# Patient Record
Sex: FEMALE | Race: OTHER | HISPANIC OR LATINO | Employment: PART TIME | URBAN - METROPOLITAN AREA
[De-identification: names, ages, dates, MRNs, and addresses within clinical notes are randomized per-mention and may not be internally consistent; named-entity substitution may affect disease eponyms.]

---

## 2017-01-04 ENCOUNTER — APPOINTMENT (OUTPATIENT)
Dept: PHYSICAL THERAPY | Age: 42
End: 2017-01-04
Payer: COMMERCIAL

## 2017-01-04 PROCEDURE — 97110 THERAPEUTIC EXERCISES: CPT

## 2017-01-04 PROCEDURE — 97035 APP MDLTY 1+ULTRASOUND EA 15: CPT

## 2017-01-06 ENCOUNTER — APPOINTMENT (OUTPATIENT)
Dept: PHYSICAL THERAPY | Age: 42
End: 2017-01-06
Payer: COMMERCIAL

## 2017-01-11 ENCOUNTER — APPOINTMENT (OUTPATIENT)
Dept: PHYSICAL THERAPY | Age: 42
End: 2017-01-11
Payer: COMMERCIAL

## 2017-01-13 ENCOUNTER — APPOINTMENT (OUTPATIENT)
Dept: PHYSICAL THERAPY | Age: 42
End: 2017-01-13
Payer: COMMERCIAL

## 2017-01-16 ENCOUNTER — GENERIC CONVERSION - ENCOUNTER (OUTPATIENT)
Dept: OTHER | Facility: OTHER | Age: 42
End: 2017-01-16

## 2017-01-17 ENCOUNTER — APPOINTMENT (OUTPATIENT)
Dept: LAB | Facility: HOSPITAL | Age: 42
End: 2017-01-17
Payer: COMMERCIAL

## 2017-01-17 ENCOUNTER — TRANSCRIBE ORDERS (OUTPATIENT)
Dept: LAB | Facility: HOSPITAL | Age: 42
End: 2017-01-17

## 2017-01-17 DIAGNOSIS — F19.20 COMBINATIONS OF DRUG DEPENDENCE EXCLUDING OPIOID TYPE DRUG, CONTINUOUS (HCC): ICD-10-CM

## 2017-01-17 DIAGNOSIS — F41.1 GENERALIZED ANXIETY DISORDER: ICD-10-CM

## 2017-01-17 DIAGNOSIS — F31.81 BIPOLAR II DISORDER (HCC): Primary | ICD-10-CM

## 2017-01-17 DIAGNOSIS — F31.81 BIPOLAR II DISORDER (HCC): ICD-10-CM

## 2017-01-17 LAB
25(OH)D3 SERPL-MCNC: 15.7 NG/ML (ref 30–100)
ALBUMIN SERPL BCP-MCNC: 3.7 G/DL (ref 3.5–5)
ALP SERPL-CCNC: 66 U/L (ref 46–116)
ALT SERPL W P-5'-P-CCNC: 21 U/L (ref 12–78)
ANION GAP SERPL CALCULATED.3IONS-SCNC: 7 MMOL/L (ref 4–13)
AST SERPL W P-5'-P-CCNC: 12 U/L (ref 5–45)
BASOPHILS # BLD AUTO: 0.05 THOUSANDS/ΜL (ref 0–0.1)
BASOPHILS NFR BLD AUTO: 1 % (ref 0–1)
BILIRUB SERPL-MCNC: 0.45 MG/DL (ref 0.2–1)
BUN SERPL-MCNC: 11 MG/DL (ref 5–25)
CALCIUM SERPL-MCNC: 8.6 MG/DL (ref 8.3–10.1)
CHLORIDE SERPL-SCNC: 103 MMOL/L (ref 100–108)
CHOLEST SERPL-MCNC: 171 MG/DL (ref 50–200)
CO2 SERPL-SCNC: 27 MMOL/L (ref 21–32)
CREAT SERPL-MCNC: 0.83 MG/DL (ref 0.6–1.3)
EOSINOPHIL # BLD AUTO: 0.13 THOUSAND/ΜL (ref 0–0.61)
EOSINOPHIL NFR BLD AUTO: 2 % (ref 0–6)
ERYTHROCYTE [DISTWIDTH] IN BLOOD BY AUTOMATED COUNT: 15 % (ref 11.6–15.1)
EST. AVERAGE GLUCOSE BLD GHB EST-MCNC: 103 MG/DL
GFR SERPL CREATININE-BSD FRML MDRD: >60 ML/MIN/1.73SQ M
GLUCOSE SERPL-MCNC: 88 MG/DL (ref 65–140)
HBA1C MFR BLD: 5.2 % (ref 4.2–6.3)
HCT VFR BLD AUTO: 40.2 % (ref 34.8–46.1)
HDLC SERPL-MCNC: 59 MG/DL (ref 40–60)
HGB BLD-MCNC: 13 G/DL (ref 11.5–15.4)
LDLC SERPL CALC-MCNC: 96 MG/DL (ref 0–100)
LYMPHOCYTES # BLD AUTO: 1.17 THOUSANDS/ΜL (ref 0.6–4.47)
LYMPHOCYTES NFR BLD AUTO: 20 % (ref 14–44)
MCH RBC QN AUTO: 26.6 PG (ref 26.8–34.3)
MCHC RBC AUTO-ENTMCNC: 32.3 G/DL (ref 31.4–37.4)
MCV RBC AUTO: 82 FL (ref 82–98)
MONOCYTES # BLD AUTO: 0.54 THOUSAND/ΜL (ref 0.17–1.22)
MONOCYTES NFR BLD AUTO: 9 % (ref 4–12)
NEUTROPHILS # BLD AUTO: 3.88 THOUSANDS/ΜL (ref 1.85–7.62)
NEUTS SEG NFR BLD AUTO: 68 % (ref 43–75)
NRBC BLD AUTO-RTO: 0 /100 WBCS
PLATELET # BLD AUTO: 257 THOUSANDS/UL (ref 149–390)
PMV BLD AUTO: 11.7 FL (ref 8.9–12.7)
POTASSIUM SERPL-SCNC: 3.5 MMOL/L (ref 3.5–5.3)
PROT SERPL-MCNC: 8.2 G/DL (ref 6.4–8.2)
RBC # BLD AUTO: 4.89 MILLION/UL (ref 3.81–5.12)
SODIUM SERPL-SCNC: 137 MMOL/L (ref 136–145)
TRIGL SERPL-MCNC: 79 MG/DL
TSH SERPL DL<=0.05 MIU/L-ACNC: 1.76 UIU/ML (ref 0.36–3.74)
WBC # BLD AUTO: 5.79 THOUSAND/UL (ref 4.31–10.16)

## 2017-01-17 PROCEDURE — 85025 COMPLETE CBC W/AUTO DIFF WBC: CPT

## 2017-01-17 PROCEDURE — 84443 ASSAY THYROID STIM HORMONE: CPT

## 2017-01-17 PROCEDURE — 80307 DRUG TEST PRSMV CHEM ANLYZR: CPT | Performed by: PSYCHIATRY & NEUROLOGY

## 2017-01-17 PROCEDURE — 80053 COMPREHEN METABOLIC PANEL: CPT

## 2017-01-17 PROCEDURE — 83036 HEMOGLOBIN GLYCOSYLATED A1C: CPT

## 2017-01-17 PROCEDURE — 36415 COLL VENOUS BLD VENIPUNCTURE: CPT

## 2017-01-17 PROCEDURE — 82306 VITAMIN D 25 HYDROXY: CPT

## 2017-01-17 PROCEDURE — 80061 LIPID PANEL: CPT

## 2017-01-18 LAB
AMPHETAMINES UR QL SCN: NEGATIVE NG/ML
BARBITURATES UR QL SCN: NEGATIVE NG/ML
BENZODIAZ UR QL SCN: NEGATIVE NG/ML
BZE UR QL SCN: NEGATIVE NG/ML
CANNABINOIDS UR QL SCN: NEGATIVE NG/ML
METHADONE UR QL SCN: NEGATIVE NG/ML
OPIATES UR QL: NEGATIVE NG/ML
PCP UR QL: NEGATIVE NG/ML
PROPOXYPH UR QL: NEGATIVE NG/ML

## 2017-01-28 ENCOUNTER — TRANSCRIBE ORDERS (OUTPATIENT)
Dept: LAB | Facility: HOSPITAL | Age: 42
End: 2017-01-28

## 2017-01-28 ENCOUNTER — APPOINTMENT (OUTPATIENT)
Dept: LAB | Facility: HOSPITAL | Age: 42
End: 2017-01-28
Payer: COMMERCIAL

## 2017-01-28 DIAGNOSIS — E78.2 MIXED HYPERLIPIDEMIA: ICD-10-CM

## 2017-01-28 DIAGNOSIS — R73.09 IMPAIRED GLUCOSE TOLERANCE TEST: ICD-10-CM

## 2017-01-28 DIAGNOSIS — E55.9 UNSPECIFIED VITAMIN D DEFICIENCY: ICD-10-CM

## 2017-01-28 DIAGNOSIS — E66.9 OBESITY, UNSPECIFIED: ICD-10-CM

## 2017-01-28 DIAGNOSIS — E66.9 OBESITY, UNSPECIFIED: Primary | ICD-10-CM

## 2017-01-28 LAB
25(OH)D3 SERPL-MCNC: 18.3 NG/ML (ref 30–100)
ALBUMIN SERPL BCP-MCNC: 3.6 G/DL (ref 3.5–5)
ALP SERPL-CCNC: 66 U/L (ref 46–116)
ALT SERPL W P-5'-P-CCNC: 21 U/L (ref 12–78)
ANION GAP SERPL CALCULATED.3IONS-SCNC: 8 MMOL/L (ref 4–13)
AST SERPL W P-5'-P-CCNC: 13 U/L (ref 5–45)
BILIRUB SERPL-MCNC: 0.45 MG/DL (ref 0.2–1)
BUN SERPL-MCNC: 11 MG/DL (ref 5–25)
CALCIUM SERPL-MCNC: 9 MG/DL (ref 8.3–10.1)
CHLORIDE SERPL-SCNC: 108 MMOL/L (ref 100–108)
CHOLEST SERPL-MCNC: 160 MG/DL (ref 50–200)
CO2 SERPL-SCNC: 24 MMOL/L (ref 21–32)
CREAT SERPL-MCNC: 0.84 MG/DL (ref 0.6–1.3)
EST. AVERAGE GLUCOSE BLD GHB EST-MCNC: 91 MG/DL
GFR SERPL CREATININE-BSD FRML MDRD: >60 ML/MIN/1.73SQ M
GLUCOSE SERPL-MCNC: 88 MG/DL (ref 65–140)
HBA1C MFR BLD: 4.8 % (ref 4.2–6.3)
HDLC SERPL-MCNC: 60 MG/DL (ref 40–60)
LDLC SERPL CALC-MCNC: 90 MG/DL (ref 0–100)
POTASSIUM SERPL-SCNC: 3.9 MMOL/L (ref 3.5–5.3)
PROT SERPL-MCNC: 7.9 G/DL (ref 6.4–8.2)
SODIUM SERPL-SCNC: 140 MMOL/L (ref 136–145)
TRIGL SERPL-MCNC: 50 MG/DL
TSH SERPL DL<=0.05 MIU/L-ACNC: 1.8 UIU/ML (ref 0.36–3.74)

## 2017-01-28 PROCEDURE — 36415 COLL VENOUS BLD VENIPUNCTURE: CPT

## 2017-01-28 PROCEDURE — 83036 HEMOGLOBIN GLYCOSYLATED A1C: CPT

## 2017-01-28 PROCEDURE — 80061 LIPID PANEL: CPT

## 2017-01-28 PROCEDURE — 84443 ASSAY THYROID STIM HORMONE: CPT

## 2017-01-28 PROCEDURE — 82306 VITAMIN D 25 HYDROXY: CPT

## 2017-01-28 PROCEDURE — 80053 COMPREHEN METABOLIC PANEL: CPT

## 2017-02-24 ENCOUNTER — ALLSCRIPTS OFFICE VISIT (OUTPATIENT)
Dept: OTHER | Facility: OTHER | Age: 42
End: 2017-02-24

## 2017-03-03 ENCOUNTER — LAB REQUISITION (OUTPATIENT)
Dept: LAB | Facility: HOSPITAL | Age: 42
End: 2017-03-03
Payer: COMMERCIAL

## 2017-03-03 ENCOUNTER — ALLSCRIPTS OFFICE VISIT (OUTPATIENT)
Dept: OTHER | Facility: OTHER | Age: 42
End: 2017-03-03

## 2017-03-03 ENCOUNTER — GENERIC CONVERSION - ENCOUNTER (OUTPATIENT)
Dept: OTHER | Facility: OTHER | Age: 42
End: 2017-03-03

## 2017-03-03 DIAGNOSIS — R31.9 HEMATURIA: ICD-10-CM

## 2017-03-03 LAB
BACTERIA UR QL AUTO: ABNORMAL /HPF
BILIRUB UR QL STRIP: NEGATIVE
BILIRUB UR QL STRIP: NEGATIVE
CLARITY UR: ABNORMAL
CLARITY UR: NORMAL
COLOR UR: YELLOW
COLOR UR: YELLOW
GLUCOSE (HISTORICAL): NEGATIVE
GLUCOSE UR STRIP-MCNC: NEGATIVE MG/DL
HGB UR QL STRIP.AUTO: NEGATIVE
HGB UR QL STRIP.AUTO: NEGATIVE
HYALINE CASTS #/AREA URNS LPF: ABNORMAL /LPF
KETONES UR STRIP-MCNC: NEGATIVE MG/DL
KETONES UR STRIP-MCNC: NEGATIVE MG/DL
LEUKOCYTE ESTERASE UR QL STRIP: ABNORMAL
LEUKOCYTE ESTERASE UR QL STRIP: NORMAL
NITRITE UR QL STRIP: NEGATIVE
NITRITE UR QL STRIP: NEGATIVE
NON-SQ EPI CELLS URNS QL MICRO: ABNORMAL /HPF
PH UR STRIP.AUTO: 6.5 [PH]
PH UR STRIP.AUTO: 7.5 [PH] (ref 4.5–8)
PROT UR STRIP-MCNC: NEGATIVE MG/DL
PROT UR STRIP-MCNC: NEGATIVE MG/DL
RBC #/AREA URNS AUTO: ABNORMAL /HPF
SP GR UR STRIP.AUTO: 1.01
SP GR UR STRIP.AUTO: 1.01 (ref 1–1.03)
UROBILINOGEN UR QL STRIP.AUTO: 0.2
UROBILINOGEN UR QL STRIP.AUTO: 0.2 E.U./DL
WBC #/AREA URNS AUTO: ABNORMAL /HPF

## 2017-03-03 PROCEDURE — 81001 URINALYSIS AUTO W/SCOPE: CPT | Performed by: NURSE PRACTITIONER

## 2017-03-03 PROCEDURE — 87086 URINE CULTURE/COLONY COUNT: CPT | Performed by: NURSE PRACTITIONER

## 2017-03-03 PROCEDURE — 87147 CULTURE TYPE IMMUNOLOGIC: CPT | Performed by: NURSE PRACTITIONER

## 2017-03-04 LAB — BACTERIA UR CULT: NORMAL

## 2017-04-04 ENCOUNTER — APPOINTMENT (EMERGENCY)
Dept: RADIOLOGY | Facility: HOSPITAL | Age: 42
End: 2017-04-04
Payer: COMMERCIAL

## 2017-04-04 ENCOUNTER — HOSPITAL ENCOUNTER (EMERGENCY)
Facility: HOSPITAL | Age: 42
Discharge: HOME | End: 2017-04-04
Attending: EMERGENCY MEDICINE | Admitting: EMERGENCY MEDICINE
Payer: COMMERCIAL

## 2017-04-04 VITALS
HEIGHT: 63 IN | SYSTOLIC BLOOD PRESSURE: 118 MMHG | RESPIRATION RATE: 18 BRPM | OXYGEN SATURATION: 100 % | DIASTOLIC BLOOD PRESSURE: 72 MMHG | WEIGHT: 189 LBS | TEMPERATURE: 97.4 F | HEART RATE: 55 BPM | BODY MASS INDEX: 33.49 KG/M2

## 2017-04-04 DIAGNOSIS — G43.909 MIGRAINE: Primary | ICD-10-CM

## 2017-04-04 PROCEDURE — 93005 ELECTROCARDIOGRAM TRACING: CPT

## 2017-04-04 PROCEDURE — 70450 CT HEAD/BRAIN W/O DYE: CPT

## 2017-04-04 PROCEDURE — 96361 HYDRATE IV INFUSION ADD-ON: CPT

## 2017-04-04 PROCEDURE — 96375 TX/PRO/DX INJ NEW DRUG ADDON: CPT

## 2017-04-04 PROCEDURE — 96374 THER/PROPH/DIAG INJ IV PUSH: CPT

## 2017-04-04 PROCEDURE — 99284 EMERGENCY DEPT VISIT MOD MDM: CPT

## 2017-04-04 RX ORDER — METOCLOPRAMIDE HYDROCHLORIDE 5 MG/ML
10 INJECTION INTRAMUSCULAR; INTRAVENOUS ONCE
Status: COMPLETED | OUTPATIENT
Start: 2017-04-04 | End: 2017-04-04

## 2017-04-04 RX ORDER — DIPHENHYDRAMINE HYDROCHLORIDE 50 MG/ML
25 INJECTION INTRAMUSCULAR; INTRAVENOUS ONCE
Status: COMPLETED | OUTPATIENT
Start: 2017-04-04 | End: 2017-04-04

## 2017-04-04 RX ORDER — KETOROLAC TROMETHAMINE 30 MG/ML
15 INJECTION, SOLUTION INTRAMUSCULAR; INTRAVENOUS ONCE
Status: COMPLETED | OUTPATIENT
Start: 2017-04-04 | End: 2017-04-04

## 2017-04-04 RX ORDER — NAPROXEN 500 MG/1
500 TABLET ORAL 2 TIMES DAILY WITH MEALS
Qty: 30 TABLET | Refills: 0 | Status: SHIPPED | OUTPATIENT
Start: 2017-04-04 | End: 2018-03-11

## 2017-04-04 RX ADMIN — METOCLOPRAMIDE 10 MG: 5 INJECTION, SOLUTION INTRAMUSCULAR; INTRAVENOUS at 21:55

## 2017-04-04 RX ADMIN — SODIUM CHLORIDE 1000 ML: 0.9 INJECTION, SOLUTION INTRAVENOUS at 21:54

## 2017-04-04 RX ADMIN — KETOROLAC TROMETHAMINE 15 MG: 30 INJECTION, SOLUTION INTRAMUSCULAR at 21:55

## 2017-04-04 RX ADMIN — DIPHENHYDRAMINE HYDROCHLORIDE 25 MG: 50 INJECTION, SOLUTION INTRAMUSCULAR; INTRAVENOUS at 21:55

## 2017-04-05 LAB
ATRIAL RATE: 70 BPM
P AXIS: 22 DEGREES
PR INTERVAL: 136 MS
QRS AXIS: 21 DEGREES
QRSD INTERVAL: 100 MS
QT INTERVAL: 416 MS
QTC INTERVAL: 449 MS
T WAVE AXIS: 21 DEGREES
VENTRICULAR RATE: 70 BPM

## 2017-04-10 ENCOUNTER — ALLSCRIPTS OFFICE VISIT (OUTPATIENT)
Dept: OTHER | Facility: OTHER | Age: 42
End: 2017-04-10

## 2017-07-13 ENCOUNTER — GENERIC CONVERSION - ENCOUNTER (OUTPATIENT)
Dept: OTHER | Facility: OTHER | Age: 42
End: 2017-07-13

## 2017-08-09 ENCOUNTER — APPOINTMENT (OUTPATIENT)
Dept: URGENT CARE | Age: 42
End: 2017-08-09
Payer: OTHER MISCELLANEOUS

## 2017-08-09 PROCEDURE — G0383 LEV 4 HOSP TYPE B ED VISIT: HCPCS | Performed by: PREVENTIVE MEDICINE

## 2017-08-09 PROCEDURE — 99284 EMERGENCY DEPT VISIT MOD MDM: CPT | Performed by: PREVENTIVE MEDICINE

## 2017-08-12 ENCOUNTER — APPOINTMENT (OUTPATIENT)
Dept: URGENT CARE | Age: 42
End: 2017-08-12
Payer: OTHER MISCELLANEOUS

## 2017-08-12 ENCOUNTER — APPOINTMENT (OUTPATIENT)
Dept: RADIOLOGY | Age: 42
End: 2017-08-12
Attending: PHYSICIAN ASSISTANT
Payer: OTHER MISCELLANEOUS

## 2017-08-12 ENCOUNTER — TRANSCRIBE ORDERS (OUTPATIENT)
Dept: URGENT CARE | Age: 42
End: 2017-08-12

## 2017-08-12 DIAGNOSIS — T14.90XA INJURY: Primary | ICD-10-CM

## 2017-08-12 PROCEDURE — 99213 OFFICE O/P EST LOW 20 MIN: CPT | Performed by: PREVENTIVE MEDICINE

## 2017-08-12 PROCEDURE — 72100 X-RAY EXAM L-S SPINE 2/3 VWS: CPT

## 2017-08-18 ENCOUNTER — APPOINTMENT (OUTPATIENT)
Dept: URGENT CARE | Age: 42
End: 2017-08-18
Payer: OTHER MISCELLANEOUS

## 2017-08-18 PROCEDURE — 99213 OFFICE O/P EST LOW 20 MIN: CPT | Performed by: PREVENTIVE MEDICINE

## 2017-08-25 ENCOUNTER — APPOINTMENT (OUTPATIENT)
Dept: URGENT CARE | Age: 42
End: 2017-08-25
Payer: OTHER MISCELLANEOUS

## 2017-08-25 PROCEDURE — 99213 OFFICE O/P EST LOW 20 MIN: CPT | Performed by: PREVENTIVE MEDICINE

## 2017-08-28 ENCOUNTER — APPOINTMENT (OUTPATIENT)
Dept: PHYSICAL THERAPY | Facility: CLINIC | Age: 42
End: 2017-08-28
Payer: OTHER MISCELLANEOUS

## 2017-08-28 PROCEDURE — 97161 PT EVAL LOW COMPLEX 20 MIN: CPT

## 2017-08-31 ENCOUNTER — APPOINTMENT (OUTPATIENT)
Dept: PHYSICAL THERAPY | Facility: CLINIC | Age: 42
End: 2017-08-31
Payer: OTHER MISCELLANEOUS

## 2017-08-31 PROCEDURE — 97110 THERAPEUTIC EXERCISES: CPT

## 2017-08-31 PROCEDURE — 97140 MANUAL THERAPY 1/> REGIONS: CPT

## 2017-09-05 ENCOUNTER — APPOINTMENT (OUTPATIENT)
Dept: PHYSICAL THERAPY | Facility: CLINIC | Age: 42
End: 2017-09-05
Payer: OTHER MISCELLANEOUS

## 2017-09-05 PROCEDURE — 97110 THERAPEUTIC EXERCISES: CPT

## 2017-09-05 PROCEDURE — 97140 MANUAL THERAPY 1/> REGIONS: CPT

## 2017-09-07 ENCOUNTER — APPOINTMENT (OUTPATIENT)
Dept: PHYSICAL THERAPY | Facility: CLINIC | Age: 42
End: 2017-09-07
Payer: OTHER MISCELLANEOUS

## 2017-09-08 ENCOUNTER — APPOINTMENT (OUTPATIENT)
Dept: URGENT CARE | Age: 42
End: 2017-09-08
Payer: OTHER MISCELLANEOUS

## 2017-09-08 PROCEDURE — 99213 OFFICE O/P EST LOW 20 MIN: CPT | Performed by: PREVENTIVE MEDICINE

## 2017-09-11 ENCOUNTER — APPOINTMENT (OUTPATIENT)
Dept: PHYSICAL THERAPY | Facility: CLINIC | Age: 42
End: 2017-09-11
Payer: OTHER MISCELLANEOUS

## 2017-09-11 PROCEDURE — 97140 MANUAL THERAPY 1/> REGIONS: CPT

## 2017-09-11 PROCEDURE — 97110 THERAPEUTIC EXERCISES: CPT

## 2017-09-14 ENCOUNTER — APPOINTMENT (OUTPATIENT)
Dept: PHYSICAL THERAPY | Facility: CLINIC | Age: 42
End: 2017-09-14
Payer: OTHER MISCELLANEOUS

## 2017-09-14 PROCEDURE — 97010 HOT OR COLD PACKS THERAPY: CPT

## 2017-09-14 PROCEDURE — 97110 THERAPEUTIC EXERCISES: CPT

## 2017-09-14 PROCEDURE — 97140 MANUAL THERAPY 1/> REGIONS: CPT

## 2017-09-15 ENCOUNTER — APPOINTMENT (OUTPATIENT)
Dept: URGENT CARE | Age: 42
End: 2017-09-15
Payer: OTHER MISCELLANEOUS

## 2017-09-15 PROCEDURE — 99214 OFFICE O/P EST MOD 30 MIN: CPT | Performed by: PREVENTIVE MEDICINE

## 2017-09-18 ENCOUNTER — APPOINTMENT (OUTPATIENT)
Dept: PHYSICAL THERAPY | Facility: CLINIC | Age: 42
End: 2017-09-18
Payer: OTHER MISCELLANEOUS

## 2017-09-18 PROCEDURE — 97110 THERAPEUTIC EXERCISES: CPT

## 2017-09-18 PROCEDURE — 97140 MANUAL THERAPY 1/> REGIONS: CPT

## 2017-09-21 ENCOUNTER — APPOINTMENT (OUTPATIENT)
Dept: PHYSICAL THERAPY | Facility: CLINIC | Age: 42
End: 2017-09-21
Payer: OTHER MISCELLANEOUS

## 2017-09-21 PROCEDURE — 97010 HOT OR COLD PACKS THERAPY: CPT

## 2017-09-21 PROCEDURE — 97140 MANUAL THERAPY 1/> REGIONS: CPT

## 2017-09-21 PROCEDURE — 97110 THERAPEUTIC EXERCISES: CPT

## 2017-09-26 ENCOUNTER — APPOINTMENT (OUTPATIENT)
Dept: URGENT CARE | Age: 42
End: 2017-09-26
Payer: OTHER MISCELLANEOUS

## 2017-09-26 ENCOUNTER — APPOINTMENT (OUTPATIENT)
Dept: PHYSICAL THERAPY | Facility: CLINIC | Age: 42
End: 2017-09-26
Payer: OTHER MISCELLANEOUS

## 2017-09-26 PROCEDURE — 99205 OFFICE O/P NEW HI 60 MIN: CPT | Performed by: PREVENTIVE MEDICINE

## 2017-09-26 PROCEDURE — 97110 THERAPEUTIC EXERCISES: CPT

## 2017-09-26 PROCEDURE — 97140 MANUAL THERAPY 1/> REGIONS: CPT

## 2017-09-28 ENCOUNTER — APPOINTMENT (OUTPATIENT)
Dept: PHYSICAL THERAPY | Facility: CLINIC | Age: 42
End: 2017-09-28
Payer: OTHER MISCELLANEOUS

## 2017-09-28 PROCEDURE — 97110 THERAPEUTIC EXERCISES: CPT

## 2017-09-28 PROCEDURE — 97140 MANUAL THERAPY 1/> REGIONS: CPT

## 2017-10-02 ENCOUNTER — APPOINTMENT (OUTPATIENT)
Dept: PHYSICAL THERAPY | Facility: CLINIC | Age: 42
End: 2017-10-02
Payer: OTHER MISCELLANEOUS

## 2017-10-02 PROCEDURE — 97140 MANUAL THERAPY 1/> REGIONS: CPT

## 2017-10-02 PROCEDURE — 97110 THERAPEUTIC EXERCISES: CPT

## 2017-10-04 ENCOUNTER — APPOINTMENT (OUTPATIENT)
Dept: URGENT CARE | Age: 42
End: 2017-10-04
Payer: OTHER MISCELLANEOUS

## 2017-10-04 PROCEDURE — 99213 OFFICE O/P EST LOW 20 MIN: CPT | Performed by: PREVENTIVE MEDICINE

## 2017-10-05 ENCOUNTER — APPOINTMENT (OUTPATIENT)
Dept: PHYSICAL THERAPY | Facility: CLINIC | Age: 42
End: 2017-10-05
Payer: OTHER MISCELLANEOUS

## 2017-10-05 PROCEDURE — 97112 NEUROMUSCULAR REEDUCATION: CPT

## 2017-10-05 PROCEDURE — 97110 THERAPEUTIC EXERCISES: CPT

## 2017-10-05 PROCEDURE — 97140 MANUAL THERAPY 1/> REGIONS: CPT

## 2017-10-09 ENCOUNTER — APPOINTMENT (OUTPATIENT)
Dept: PHYSICAL THERAPY | Facility: CLINIC | Age: 42
End: 2017-10-09
Payer: OTHER MISCELLANEOUS

## 2017-10-09 PROCEDURE — 97140 MANUAL THERAPY 1/> REGIONS: CPT

## 2017-10-09 PROCEDURE — 97110 THERAPEUTIC EXERCISES: CPT

## 2017-10-16 ENCOUNTER — APPOINTMENT (OUTPATIENT)
Dept: URGENT CARE | Age: 42
End: 2017-10-16
Payer: OTHER MISCELLANEOUS

## 2017-10-16 PROCEDURE — 99213 OFFICE O/P EST LOW 20 MIN: CPT | Performed by: PREVENTIVE MEDICINE

## 2017-10-17 ENCOUNTER — APPOINTMENT (OUTPATIENT)
Dept: PHYSICAL THERAPY | Facility: CLINIC | Age: 42
End: 2017-10-17
Payer: OTHER MISCELLANEOUS

## 2017-10-17 PROCEDURE — G8991 OTHER PT/OT GOAL STATUS: HCPCS

## 2017-10-17 PROCEDURE — 97110 THERAPEUTIC EXERCISES: CPT

## 2017-10-17 PROCEDURE — G8990 OTHER PT/OT CURRENT STATUS: HCPCS

## 2017-10-19 ENCOUNTER — APPOINTMENT (OUTPATIENT)
Dept: PHYSICAL THERAPY | Facility: CLINIC | Age: 42
End: 2017-10-19
Payer: OTHER MISCELLANEOUS

## 2017-10-19 PROCEDURE — 97140 MANUAL THERAPY 1/> REGIONS: CPT

## 2017-10-19 PROCEDURE — 97112 NEUROMUSCULAR REEDUCATION: CPT

## 2017-10-19 PROCEDURE — 97110 THERAPEUTIC EXERCISES: CPT

## 2017-10-23 ENCOUNTER — APPOINTMENT (OUTPATIENT)
Dept: PHYSICAL THERAPY | Facility: CLINIC | Age: 42
End: 2017-10-23
Payer: OTHER MISCELLANEOUS

## 2017-10-26 ENCOUNTER — APPOINTMENT (OUTPATIENT)
Dept: PHYSICAL THERAPY | Facility: CLINIC | Age: 42
End: 2017-10-26
Payer: OTHER MISCELLANEOUS

## 2017-10-26 PROCEDURE — 97110 THERAPEUTIC EXERCISES: CPT

## 2017-10-26 PROCEDURE — 97112 NEUROMUSCULAR REEDUCATION: CPT

## 2017-10-26 PROCEDURE — 97150 GROUP THERAPEUTIC PROCEDURES: CPT

## 2017-10-30 ENCOUNTER — APPOINTMENT (OUTPATIENT)
Dept: PHYSICAL THERAPY | Facility: CLINIC | Age: 42
End: 2017-10-30
Payer: OTHER MISCELLANEOUS

## 2017-10-31 ENCOUNTER — APPOINTMENT (OUTPATIENT)
Dept: URGENT CARE | Age: 42
End: 2017-10-31
Payer: OTHER MISCELLANEOUS

## 2017-10-31 PROCEDURE — 99213 OFFICE O/P EST LOW 20 MIN: CPT | Performed by: PREVENTIVE MEDICINE

## 2017-12-29 ENCOUNTER — HOSPITAL ENCOUNTER (EMERGENCY)
Facility: HOSPITAL | Age: 42
Discharge: HOME | End: 2017-12-29
Attending: EMERGENCY MEDICINE | Admitting: EMERGENCY MEDICINE
Payer: COMMERCIAL

## 2017-12-29 VITALS
HEIGHT: 63 IN | RESPIRATION RATE: 18 BRPM | TEMPERATURE: 97.8 F | HEART RATE: 91 BPM | BODY MASS INDEX: 30.83 KG/M2 | DIASTOLIC BLOOD PRESSURE: 63 MMHG | OXYGEN SATURATION: 99 % | SYSTOLIC BLOOD PRESSURE: 137 MMHG | WEIGHT: 174 LBS

## 2017-12-29 DIAGNOSIS — R51.9 HEADACHE: Primary | ICD-10-CM

## 2017-12-29 PROCEDURE — 99283 EMERGENCY DEPT VISIT LOW MDM: CPT

## 2017-12-29 PROCEDURE — 96361 HYDRATE IV INFUSION ADD-ON: CPT

## 2017-12-29 PROCEDURE — 96374 THER/PROPH/DIAG INJ IV PUSH: CPT

## 2017-12-29 PROCEDURE — 96375 TX/PRO/DX INJ NEW DRUG ADDON: CPT

## 2017-12-29 RX ORDER — KETOROLAC TROMETHAMINE 30 MG/ML
15 INJECTION, SOLUTION INTRAMUSCULAR; INTRAVENOUS ONCE
Status: COMPLETED | OUTPATIENT
Start: 2017-12-29 | End: 2017-12-29

## 2017-12-29 RX ORDER — METOCLOPRAMIDE HYDROCHLORIDE 5 MG/ML
10 INJECTION INTRAMUSCULAR; INTRAVENOUS ONCE
Status: COMPLETED | OUTPATIENT
Start: 2017-12-29 | End: 2017-12-29

## 2017-12-29 RX ADMIN — KETOROLAC TROMETHAMINE 15 MG: 30 INJECTION, SOLUTION INTRAMUSCULAR at 16:31

## 2017-12-29 RX ADMIN — SODIUM CHLORIDE 1000 ML: 0.9 INJECTION, SOLUTION INTRAVENOUS at 16:31

## 2017-12-29 RX ADMIN — METOCLOPRAMIDE 10 MG: 5 INJECTION, SOLUTION INTRAMUSCULAR; INTRAVENOUS at 16:31

## 2017-12-29 NOTE — DISCHARGE INSTRUCTIONS
Dolor de dante severo, cuidados ambulatorios   INFORMACIÓN GENERAL:   Un dolor de dante severo  es un dolor o incomodidad que empieza de repente y empeora rápidamente  Se desconoce la causa de un dolor de dante severo o donald  Es posible que sea provocado por estrés, fatiga, hormonas, alimentos o traumas  Los siguientes son los síntomas más comunes de un donald dolor de dante:   · Fiebre    · Presión en los senos paranasales (sinusitis)    · Pérdida de la memoria    · Náuseas o vómito    · Problemas con la visión, wander ojos rojos, lagrimeo, pérdida de la visión o dolor con la katie brillante    · Rigidez del contreras    · Sensibilidad del área de la dante y el contreras    · Mayor dificultad de la acostumbrada para permanecer despierto o para estar alerta que     · Debilidad o falta de energía  Busque atención inmediata al presentar los siguientes síntomas:   · Dolor donald    · Un dolor de dante que ocurre después de un golpe a la dante, rod caída o un trauma     · Confusión o estar olvidadizo    · Entumecimiento a un costado de la ludy o del cuerpo  El tratamiento para un dolor de dante donald  puede incluir medicamento para tratar el dolor  También puede necesitar terapia de retro-alimentiación (biofeedback) o terapia de comportamiento cognitivo  Consulte con cee proveedor de [employer] y otros tratamientos para un dolor de dante donald  La forma de lidiar con los síntomas:   · Aplique calor  en cee dante daniel 20 a 30 minutos cada 2 horas por los [employer] indicaron  El calor ayuda a disminuir el dolor y los espasmos musculares  Se puede alternar entre calor y frío  · Aplique hielo  en cee dante por 15 a 20 minutos cada hora según las indicaciones  Use rod compresa de hielo o coloque hielo triturado en rod bolsa plástica y cúbrala con rod toalla  El hielo disminuye el dolor  · Relaje migdalia músculos  Acuéstese en rod posición cómoda y cierre los ojos  Relaje migdalia músculos despacio   Empiece con los dedos de migdalia pies y [employer] subiendo por arora cuerpo  · Mantenga un registro de los ryan de [place]  Griselda Clos y terminó arora dolor de [place]  Incluya los síntomas y lo que estaba haciendo cuando el dolor de [place]  Escriba en el registro lo que usted comió o bebió daniel las 24 horas antes de que empezara arora dolor de [place]  Flor C[place] dolor y [employer]  Registre lo que hizo para tratar arora dolor de dante y si funciono o no  Acuda a arora j carlos de control con arora proveedor de mitzy según le indicaron:  Lleve arora registro de los ryan de dante cuando acuda a la j carlos con arora proveedor de mitzy  Escriba las preguntas que tenga para que no se le olvide hacerlas daniel las citas médicas  ACUERDOS SOBRE ARORA CUIDADO:   Usted tiene el derecho de participar en la planificación de arora cuidado  Aprenda todo lo que pueda sobre arora condición y wander darle tratamiento  Discuta con migdalia médicos migdalia opciones de tratamiento para juntos decidir el cuidado que usted quiere recibir  Usted siempre tiene el derecho a rechazar arora tratamiento  Esta información es sólo para uso en educación  Arora intención no es darle un consejo médico sobre enfermedades o tratamientos  Colsulte con arora Michelle Angst farmacéutico antes de seguir cualquier régimen médico para saber si es seguro y efectivo para usted  © 2014 [address] is for End User's use only and may not be sold, redistributed or otherwise used for commercial purposes  All illustrations and images included in CareNotes® are the copyrighted property of A D A M , Inc  or Jose Maria Toscano

## 2017-12-29 NOTE — ED PROVIDER NOTES
History  Chief Complaint   Patient presents with    Headache     pt reports headache and light sensitivity  A 72-year-old female with no significant past medical history; presents with a headache since 10:00 p m  last evening  Headache was of gradually onset and has gotten progressively worse since onset  Headache is associated with dizziness, photophobia and phonophobia  Patient also reports multiple episodes of nausea and vomiting  Patient has not taken anything for her symptoms  Patient otherwise denies fever, chills, blurred vision, neck pain/stiffness, chest pain, shortness of breath, abdominal pain, diarrhea, paresthesias and weakness  Patient states she had a similar headache 6 months ago where she was seen in the ED and treated, patient was provided with follow-up however she did not make an appointment  Patient has had intermittent headaches over the past 6 months  A/P:  Headache, likely consistent with a migraine  Patient is neurologically intact  Neck is supple without meningismus signs  Will treat with a migraine cocktail and reassess  History provided by:  Patient      Prior to Admission Medications   Prescriptions Last Dose Informant Patient Reported? Taking?   naproxen (NAPROSYN) 500 mg tablet   No No   Sig: Take 1 tablet by mouth 2 (two) times a day with meals for 30 days      Facility-Administered Medications: None       History reviewed  No pertinent past medical history  Past Surgical History:   Procedure Laterality Date     SECTION      CHOLECYSTECTOMY      KIDNEY STONE SURGERY         History reviewed  No pertinent family history  I have reviewed and agree with the history as documented  Social History   Substance Use Topics    Smoking status: Never Smoker    Smokeless tobacco: Never Used    Alcohol use No        Review of Systems   Eyes: Positive for photophobia  Gastrointestinal: Positive for nausea and vomiting     Neurological: Positive for dizziness and headaches  All other systems reviewed and are negative  Physical Exam  ED Triage Vitals   Temperature Pulse Respirations Blood Pressure SpO2   12/29/17 1431 12/29/17 1431 12/29/17 1431 12/29/17 1431 12/29/17 1431   97 8 °F (36 6 °C) 91 18 137/63 99 %      Temp Source Heart Rate Source Patient Position - Orthostatic VS BP Location FiO2 (%)   12/29/17 1431 12/29/17 1431 12/29/17 1431 12/29/17 1431 --   Oral Monitor Sitting Left arm       Pain Score       12/29/17 1539       6           Orthostatic Vital Signs  Vitals:    12/29/17 1431   BP: 137/63   Pulse: 91   Patient Position - Orthostatic VS: Sitting       Physical Exam   General Appearance: alert and oriented, nad, non toxic appearing  Skin:  Warm, dry, intact  HEENT: atraumatic, normocephalic; PERRLA, EOMI  Neck: Supple, trachea midline; right trapezius muscle tenderness and spasm  Cardiac: RRR; no murmurs, rub, gallops  Pulmonary: lungs CTAB; no wheezes, rales, rhonchi  Gastrointestinal: abdomen soft, nontender, nondistended; no guarding or rebound tenderness; good bowel sounds, no mass or bruits  Extremities:  no pedal edema, 2+ pulses; no calf tenderness, no clubbing, no cyanosis  Neuro:  no focal motor or sensory deficits, CN 2-12 grossly intact  Psych:  Normal mood and affect, normal judgement and insight      ED Medications  Medications   sodium chloride 0 9 % bolus 1,000 mL (0 mL Intravenous Stopped 12/29/17 1742)   ketorolac (TORADOL) injection 15 mg (15 mg Intravenous Given 12/29/17 1631)   metoclopramide (REGLAN) injection 10 mg (10 mg Intravenous Given 12/29/17 1631)       Diagnostic Studies  Results Reviewed     None                 No orders to display         Procedures  Procedures      Phone Consults  ED Phone Contact    ED Course  ED Course as of Dec 30 1134   Fri Dec 29, 2017   1736 Pt feeling much better at this time  Will discharge home with PCP follow up                                  MATTHEW FarristCren Time    Disposition  Final diagnoses:   Headache     Time reflects when diagnosis was documented in both MDM as applicable and the Disposition within this note     Time User Action Codes Description Comment    12/29/2017  5:37 PM Titi Glaser Add [R51] Headache       ED Disposition     ED Disposition Condition Comment    Discharge  Aj Thomas 6298 discharge to home/self care  Condition at discharge: Good        Follow-up Information     Follow up With Specialties Details Why Maria Isabel Castro, Internal Medicine Schedule an appointment as soon as possible for a visit in 3 days For re-evaluation [facility]  [address]  [phone]          Discharge Medication List as of 12/29/2017  5:37 PM      CONTINUE these medications which have NOT CHANGED    Details   naproxen (NAPROSYN) 500 mg tablet Take 1 tablet by mouth 2 (two) times a day with meals for 30 days, Starting 4/4/2017, Until Thu 5/4/17, Print           No discharge procedures on file  ED Provider  Attending physically available and evaluated Aj EllingtonMei-ARLENE 4853  I managed the patient along with the ED Attending      Electronically Signed by         Bal Kendall DO  Resident  12/30/17 5600

## 2017-12-29 NOTE — ED NOTES
Pt approached triage room states her headache is getting worse      Alta Boyer, MEGGAN  12/29/17 1174

## 2017-12-29 NOTE — ED ATTENDING ATTESTATION
Jossue Olson DO, saw and evaluated the patient  I have discussed the patient with the resident/non-physician practitioner and agree with the resident's/non-physician practitioner's findings, Plan of Care, and MDM as documented in the resident's/non-physician practitioner's note, except where noted  All available labs and Radiology studies were reviewed  At this point I agree with the current assessment done in the Emergency Department  I have conducted an independent evaluation of this patient a history and physical is as follows:    40 yof with PHILLIPS started last night, gradual onset, not worst HA of life  Similar HA 6 months ago  /63   Pulse 91   Temp 97 8 °F (36 6 °C) (Oral)   Resp 18   Ht 5' 3" (1 6 m)   Wt 78 9 kg (174 lb)   SpO2 99%   BMI 30 82 kg/m²   NAD  Afebrile  No neuro deficits  CTA  RRR  TTP on right trap  IV in place  Metoclopramide, toradol, normal saline  Improved after treatment and wanted to go home       Dx  Headache    Critical Care Time  CritCare Time    Procedures

## 2018-01-09 NOTE — PROGRESS NOTES
Assessment    1  Hematuria (599 70) (R31 9)   2  Adenitis, acute (683) (L04 9)    Plan  Hematuria    · * CT ABDOMEN PELVIS WO CONTRAST; Status:Need Information - Financial  Authorization; Requested for:[id number];    · Urine Dip Non-Automated- POC; Status:Active - Perform Order; Requested  for:[id number];    · *VB - Follow-up With Nurse or MA For BP Check Evaluation and Treatment  Follow-up:  Please perform a dipstick on this patient and if positive for anything measure, let me  know before she leaves  Status: Canceled - Scheduling  Knee pain, right    · Ibuprofen 600 MG Oral Tablet; TOME 1 TABLET POR VIA ORAL TODOS 8  HOURS AS NEEDED FOR PAINTAKE 1 TABLET BY MOUTH EVERY 8 HOURS AS  NEEDED FOR PAIN    Discussion/Summary    You are having adenitis  please start ibuprofen for pain  RTC on Monday for Nurse visit Rayne Álvaro  Will treat based on what we find then  Please be sure to take a complete treatment of the vaginal yeast infection  The patient has the current Goals: Resolution of microscopic hematuria  The patent has the current Barriers: None  The patient was counseled regarding instructions for management, risk factor reductions, risks and benefits of treatment options, importance of compliance with treatment  Possible side effects of new medications were reviewed with the patient/guardian today  The treatment plan was reviewed with the patient/guardian  The patient/guardian understands and agrees with the treatment plan   Patient is able to Self-Care  Educational resources provided:      Chief Complaint  Patient states that she recently went to her GYN doctor, who diagnosed her with a yeast infection and also tested her urine  Her urine resulted in being positive for blood, so she was told to follow up with her PCP  Patient also states that she has been having a sore throat recently  PAULA Guajardo      Presents to the clinic for possible blood in Urine  Also sore throat x4 days History of Present Illness  HPI: as above  She went to he OB/GYN and was told there was blood in her urine upon urinalysis (dipstick)  She reports she did not see the urine with her eyes  She used to have pain with urination before going to the OB/GYN  Upon examination by the OB, was told she as a yeast infection and started on Monistat 7  Soon after that she had her menstrual period which just finished yesterday  At the time the urinalysis was done at the [facility] office, her menstrual period was about to start  Sore throat x4 days, says her daughter has been sick  Rates discomfort at 4/10 severity, says it is more of an itching feeling than pain  a little bit of sneezing, sometimes feeling hot, other times cold, at night  Did not check her temp  Denies coughing, or itchy eyes   Hospital Based Practices Required Assessment:   Pain Assessment   the patient states they have pain  The pain is located in the throat  (on a scale of 0 to 10, the patient rates the pain at 4 )    Prefered Language is  [facility] [place]  Primary Language is  Yakut  Teaching Method: verbal and written   Person Taught: patient   Evaluation Of Learning: verbalized/demonstrated understanding      Review of Systems    Constitutional: No fever, no chills, feels well, no tiredness, no recent weight gain or loss  ENT: sore throat, but no earache, no nosebleeds, no hearing loss, no nasal discharge and no hoarseness  Cardiovascular: no complaints of slow or fast heart rate, no chest pain, no palpitations, no leg claudication or lower extremity edema  Respiratory: no complaints of shortness of breath, no wheezing, no dyspnea on exertion, no orthopnea or PND  Musculoskeletal: no complaints of arthralgia, no myalgia, no joint swelling or stiffness, no limb pain or swelling  Integumentary: no complaints of skin rash or lesion, no itching or dry skin, no skin wounds  ROS reviewed  Active Problems    1  Abnormal weight gain (783 1) (R63 5)   2  Bipolar I disorder (296 7) (F31 9)   3  Fatigue (780 79) (R53 83)   4  Knee pain, right (719 46) (M29 561)   5  Need for diphtheria-tetanus-pertussis (Tdap) vaccine, adult/adolescent (V06 1) (Z23)   6  Need for prophylactic vaccination and inoculation against influenza (V04 81) (Z23)   7  Obesity (278 00) (E66 9)    Past Medical History  Active Problems And Past Medical History Reviewed: The active problems and past medical history were reviewed and updated today  Surgical History  Surgical History Reviewed: The surgical history was reviewed and updated today  Social History    · Daily caffeine consumption   · Family stress (V61 9) (Z63 9)   · Former smoker (V15 82) (Z30 027)   · Four children   · Inadequate exercise (V69 0) (Z72 3)   · Part-time employment   ·  (V61 03) (Z63 5)  The social history was reviewed and updated today  The social history was reviewed and is unchanged  Family History  Family History Reviewed: The family history was reviewed and updated today  Current Meds   1  Adipex-P 37 5 MG Oral Capsule; Therapy: (Recorded:75Bmi0400) to Recorded   2  Cepacol LOZG; Therapy: (Recorded:89Wum4730) to Recorded   3  Daily Multivitamin TABS; Therapy: (Recorded:12Dlr6484) to Recorded   4  Fluticasone Propionate 50 MCG/ACT Nasal Suspension; USE 1 TO 2 SPRAYS IN EACH   NOSTRIL ONCE DAILY; Therapy: 63VJP7230 to (03 17 74 30 53)  Requested for: 32EET0731; Last   Rx:[id number] Ordered   5  HydrOXYzine HCl - 25 MG Oral Tablet; Therapy: (Recorded:60Iga4054) to Recorded   6  Ibuprofen 600 MG Oral Tablet; TOME 1 TABLET POR VIA ORAL TODOS 8 HOURS AS   NEEDED FOR PAINTAKE 1 TABLET BY MOUTH EVERY 8 HOURS AS NEEDED FOR   PAIN;   Therapy: 86KMK0021 to (Evaluate:56Nna9942)  Requested for: 67OYS1369; Last   Rx:65Hyg0709 Ordered   7  Topiramate 25 MG Oral Tablet; Therapy: (Recorded:36Ous3469) to Recorded    The medication list was reviewed and updated today        Allergies 1  No Known Drug Allergies    2  No Known Environmental Allergies   3  No Known Food Allergies    Vitals   Recorded: 62Hjt0813 08:09AM   Temperature 98 8 F   Heart Rate 284   Systolic 402   Diastolic 82   Height 5 ft 2 5 in   Weight 191 lb 12 80 oz   BMI Calculated 34 52   BSA Calculated 1 89     Physical Exam    Constitutional   General appearance: Abnormal   Obese  female in mild discomfort  Eyes   Conjunctiva and lids: No swelling, erythema or discharge  Pupils and irises: Equal, round and reactive to light  Ears, Nose, Mouth, and Throat   External inspection of ears and nose: Normal     Otoscopic examination: Tympanic membranes translucent with normal light reflex  Canals patent without erythema  Nasal mucosa, septum, and turbinates: Normal without edema or erythema  Oropharynx: Abnormal   mild dental caries  No post nasal drip  Pulmonary   Respiratory effort: No increased work of breathing or signs of respiratory distress  Auscultation of lungs: Clear to auscultation  Cardiovascular   Auscultation of heart: Normal rate and rhythm, normal S1 and S2, without murmurs  Psychiatric   Orientation to person, place, and time: Normal     Mood and affect: Normal     Additional Exam:  Examination of the throat is negative  Examination of the neck reveals swollen lymph node on the right upper neck, at the border between the cheek and the neck  Right side only  TTP  No skin discoloration  No visible swelling of the neck  Left side of the neck is negative          Future Appointments    Date/Time Provider Specialty Site   02/27/2017 09:00 AM YEIMI Martell Nurse Schedule  [address] PCP

## 2018-01-11 NOTE — PROGRESS NOTES
Assessment    1  Right flank discomfort (789 09) (R10 9)   2  Bipolar I disorder (296 7) (F31 9)   3  History of Tubal Ligation   4  History of Removal Of Urinary Calculus   5  History of Cholecystectomy Laparoscopic   6  Family history of depression (V17 0) (Z81 8) : Mother   7  Family history of diabetes mellitus (V18 0) (Z83 3) : Father   8   (V61 03) (Z63 5)   9  Four children   10  Family stress (V61 9) (Z63 9)   11  Inadequate exercise (V69 0) (Z72 3)   12  Part-time employment   15  Daily caffeine consumption   14  Encounter for preventive health examination (V70 0) (Z00 00)   15  Need for diphtheria-tetanus-pertussis (Tdap) vaccine, adult/adolescent (V06 1) (Z23)   16  Obesity (278 00) (E66 9)    Plan  Bipolar I disorder, Health Maintenance, Right flank discomfort    · (1) CBC/PLT/DIFF; Status:Active; Requested for:10May2016;   Bipolar I disorder, Obesity    · (1) TSH; Status:Active; Requested for:10May2016; Health Maintenance    · (1) COMPREHENSIVE METABOLIC PANEL; Status:Active; Requested for:10May2016;    · (1) HEP B SURFACE ANTIGEN; Status:Active; Requested for:10May2016;    · (1) HEP C ANTIBODY; Status:Active; Requested for:10May2016;    · (1) HIV AG/AB COMBO, 4TH GEN; [Do Not Release]; Status:Active; Requested  for:10May2016;    · (1) LIPID PANEL, FASTING; Status:Active; Requested for:10May2016;    · Diets that are low in carbohydrates and high in protein are very popular for weight loss ;  Status:Complete;   Done: 38WCC8851   · Eat a low fat and low cholesterol diet ; Status:Complete;   Done: 59ZEW2169   · Eat foods that are high in calcium ; Status:Complete;   Done: 87NUD4801   · Eat no more than 30 grams of fat per day ; Status:Complete;   Done: 16CKU1579   · Some eating tips that can help you lose weight ; Status:Complete;   Done: 53ZYU8480   · There are ways to decrease your stress and improve your sense of well-being  We  encourage you to keep active and exercise regularly    Make time to take care of yourself  and participate in activities that you enjoy  Stay connected to friends and family that can  support and comfort you  If at any time you have thoughts of harming yourself or  someone else, contact us immediately ; Status:Active; Requested for:10May2016;    · We recommend that you follow the "Mediterranean diet "; Status:Complete;   Done:  56HFV2159  Health Maintenance, Need for diphtheria-tetanus-pertussis (Tdap) vaccine,  adult/adolescent    · Tdap; INJECT 0 5  ML Intramuscular; To Be Done: 30QIS0608  Obesity    · (1) HEMOGLOBIN A1C; Status:Active; Requested for:10May2016;    · Begin a limited exercise program ; Status:Complete;   Done: 73TLT1016   · We recommend that you bring your body mass index down to 26 ; Status:Complete;    Done: 81DHE0666  Right flank discomfort    · Ibuprofen 600 MG Oral Tablet; TOME 1 TABLET POR VIA ORAL TODOS 8 HOURS  AS NEEDED FOR PAINTAKE 1 TABLET BY MOUTH EVERY 8 HOURS AS NEEDED FOR  PAIN   · (1) URINALYSIS w URINE C/S REFLEX (will reflex a microscopy if leukocytes, occult  blood, or nitrites are not within normal limits); Status:Active; Requested for:10May2016;     Discussion/Summary  Discussion Summary:   Get labs done- we will call if abnormal  NO diet pills from us- weight came on gradually and needs to come off with healthy diet changes, and exercise, drink lots of water  for your M/S pain on R flank area- rx: Ibuprofen 600mg as needed for back pains  heat/ warm soaks  f/u with me in the Fall for flushot  given Tdap in office today        Chief Complaint  Chief Complaint Free Text Note Form: here to establich care- used to see PCP in [facility], but didn't like them  she relocated to [place] from [place] [facility] about 1 year ago      History of Present Illness  HPI: pt would like to lose weight- she would like a diet pill, admits to NO exercise, and she stress eats  she states she gained about 50 lbs in past 1 year, she states her son went into [employer] about 1 year ago and she's stressed    pt states she had lots of labs done about 3-4 months ago at [facility] THE [facility]- but "can't get them and didn't like them"   Hospital Based Practices Required Assessment:   Pain Assessment   the patient states they have pain  The pain is located in the back  The patient describes the pain as aching  (on a scale of 0 to 10, the patient rates the pain at 8 )    Prefered Language is  Citizen of [place]  Primary Language is  Citizen of [place]  Review of Systems  Complete-Female:   Constitutional: as noted in HPI  Eyes: eyesight problems and wears glasses- last eye exam 12/2015, last dental exam 8/2015, but as noted in HPI    ENT: no complaints of earache, no loss of hearing, no nose bleeds, no nasal discharge, no sore throat, no hoarseness  Cardiovascular: No complaints of slow heart rate, no fast heart rate, no chest pain, no palpitations, no leg claudication, no lower extremity edema  Respiratory: No complaints of shortness of breath, no wheezing, no cough, no SOB on exertion, no orthopnea, no PND  Gastrointestinal: No complaints of abdominal pain, no constipation, no nausea or vomiting, no diarrhea, no bloody stools  Genitourinary: she had R kidney surgery for large kidney stone in 2000, LMP 5/6/16 x5-6 days, states "heavy and painful", last GYN 11/2015, last PAP 2013, has f/u appt 5/2016, sexually active wit male partner, no condoms, had BTL,, but as noted in HPI  Musculoskeletal: hx: sprain and FX R ankle, with some numb/ting, also c/o Low back pain R flank pain, no exercising, but as noted in HPI  Integumentary: No complaints of skin rash or lesions, no itching, no skin wounds, no breast pain or lump  Neurological: No complaints of headache, no confusion, no convulsions, no numbness, no dizziness or fainting, no tingling, no limb weakness, no difficulty walking  Psychiatric: anxiety, depression and bipolar d/o- hasn't seen [address] [place] [address] x 3 years,, but as noted in HPI     Endocrine: No complaints of proptosis, no hot flashes, no muscle weakness, no deepening of the voice, no feelings of weakness  Hematologic/Lymphatic: No complaints of swollen glands, no swollen glands in the neck, does not bleed easily, does not bruise easily  ROS Reviewed:   ROS reviewed  Surgical History    1  History of Cholecystectomy Laparoscopic   2  History of Removal Of Urinary Calculus   3  History of Tubal Ligation    Family History  Mother    1  Family history of depression (V17 0) (Z81 8)  Father    2  Family history of diabetes mellitus (V18 0) (Z83 3)    Social History    · Daily caffeine consumption   · Family stress (V61 9) (Z63 9)   · Former smoker (V15 82) (Y20 317)   · Four children   · Inadequate exercise (V69 0) (Z72 3)   · Part-time employment   ·  (V61 03) (Z63 5)    Current Meds   1  Daily Multivitamin TABS; Therapy: (Recorded:29Ker4549) to Recorded    Allergies    1  No Known Drug Allergies    2  No Known Environmental Allergies   3  No Known Food Allergies    Vitals  Signs [Data Includes: Current Encounter]   Recorded: 72GVC9563 03:35PM   Temperature: 98 F  Heart Rate: 98  Systolic: 435  Diastolic: 72  Height: 5 ft 2 5 in  Weight: 193 lb 9 01 oz  BMI Calculated: 34 84  BSA Calculated: 1 9    Physical Exam    Constitutional   General appearance: Abnormal   obese middle aged hisp female in NAD  Head and Face   Head and face: Normal     Eyes   Conjunctiva and lids: No swelling, erythema or discharge  Pupils and irises: Equal, round, reactive to light  Ears, Nose, Mouth, and Throat   External inspection of ears and nose: Normal     Otoscopic examination: Tympanic membranes translucent with normal light reflex  Canals patent without erythema  Nasal mucosa, septum, and turbinates: Normal without edema or erythema  Lips, teeth, and gums: Abnormal   Examination of the teeth revealed poor dentition  Oropharynx: Normal with no erythema, edema, exudate or lesions      Neck   Neck: Supple, symmetric, trachea midline, no masses  Thyroid: Normal, no thyromegaly  Pulmonary   Respiratory effort: No increased work of breathing or signs of respiratory distress  Auscultation of lungs: Clear to auscultation  Cardiovascular   Auscultation of heart: Normal rate and rhythm, normal S1 and S2, no murmurs  Pedal pulses: 2+ bilaterally  Examination of extremities for edema and/or varicosities: Abnormal   no edema  Has multiple spider telengiectasis noted smitha LEs  Abdomen   Abdomen: Abnormal   obese flabby with poor tone, old striae noted and healed PWs from lap choly  Liver and spleen: No hepatomegaly or splenomegaly  Lymphatic   Palpation of lymph nodes in neck: No lymphadenopathy  Musculoskeletal   Gait and station: Normal     Digits and nails: Normal without clubbing or cyanosis  Joints, bones, and muscles: Normal     Range of motion: Normal     Stability: Normal     Muscle strength/tone: Normal     Skin   Skin and subcutaneous tissue: Abnormal   old 6inch surgical scar noted to R post flank area, and also slightly tender to palpate L intercostal area and lateral /flank area, but NO CVA tenderness  Palpation of skin and subcutaneous tissue: Abnormal   tender, no redness, no swelling  Neurologic   Cranial nerves: Cranial nerves II-XII intact  Sensation: No sensory loss  Psychiatric   Judgment and insight: Normal     Orientation to person, place, and time: Normal     Recent and remote memory: Intact  Mood and affect: Abnormal   Mood and Affect: depressed, flat and sad  Results/Data  Encounter Results   PHQ-9 Adult Depression Screening 56DVL6473 03:37PM User, Cris     Test Name Result Flag Reference   PHQ-9 Adult Depression Score 7     Over the last two weeks, how often have you been bothered by any of the following problems?   Little interest or pleasure in doing things: Nearly every day - 3  Feeling down, depressed, or hopeless: Nearly every day - 3  Trouble falling or staying asleep, or sleeping too much: Not at all - 0  Feeling tired or having little energy: Several days - 1  Poor appetite or over eating: Not at all - 0  Feeling bad about yourself - or that you are a failure or have let yourself or your family down: Not at all - 0  Trouble concentrating on things, such as reading the newspaper or watching television: Not at all - 0  Moving or speaking so slowly that other people could have noticed  Or the opposite -  being so fidgety or restless that you have been moving around a lot more than usual: Not at all - 0  Thoughts that you would be better off dead, or of hurting yourself in some way: Not at all - 0   PHQ-9 Adult Depression Screening Negative     PHQ-9 Difficulty Level Somewhat difficult     PHQ-9 Severity Mild Depression         Attending Note  Collaborating Physician Note: Collaborating Physician: I discussed the case with the Advanced Practitioner and reviewed the note and I agree with the Advanced Practitioner note        Signatures   Electronically signed by : Avi Weber; May 10 2016  4:11PM EST                       (Author)    Electronically signed by : AALIYAH Zavala ; May 10 2016  4:54PM EST                       (Author)

## 2018-01-13 VITALS
DIASTOLIC BLOOD PRESSURE: 76 MMHG | TEMPERATURE: 98.2 F | SYSTOLIC BLOOD PRESSURE: 110 MMHG | HEIGHT: 63 IN | HEART RATE: 68 BPM | WEIGHT: 189.59 LBS | BODY MASS INDEX: 33.59 KG/M2

## 2018-01-14 VITALS
HEART RATE: 100 BPM | HEIGHT: 63 IN | SYSTOLIC BLOOD PRESSURE: 122 MMHG | BODY MASS INDEX: 33.98 KG/M2 | DIASTOLIC BLOOD PRESSURE: 82 MMHG | WEIGHT: 191.8 LBS | TEMPERATURE: 98.8 F

## 2018-01-15 NOTE — PROGRESS NOTES
Assessment    1  Knee pain, right (719 46) (M20 561)   2  Bipolar I disorder (296 7) (F31 9)    Plan  Bipolar I disorder    · Mental Health Counselor Referral Other Physician Referral  Consult Only: the expectation is that the  referring provider will communicate back to the patient on treatment options  Evaluation and  Treatment: the expectation is that the referred to provider will communicate back to the patient  on treatment options  Status: Hold For - Scheduling  Requested for: 98ITE7226  are Referring to a non- Preferred Provider : Provider not listed in [facility] 12 Summary provided  : Yes   · Call [phone] if: You have feelings of extreme sadness and feelings of hopelessness ;  Status:Complete;   Done: 88EQG2460   · Call [phone] if: Your feelings of anxiety are not getting better in 2 weeks ; Status:Complete;    Done: 05BGU4282   · Call 911 if: You are considering suicide ; Status:Complete;   Done: 80JML3324   · Call 911 if: You are thinking about harming yourself or someone else ; Status:Complete;   Done:  68PBN1222  Knee pain, right    · Ibuprofen 600 MG Oral Tablet; TOME 1 TABLET POR VIA ORAL TODOS 8 HOURS AS  NEEDED FOR PAINTAKE 1 TABLET BY MOUTH EVERY 8 HOURS AS NEEDED FOR PAIN   · *1 - [facility] Physical [facility]  Consult Only: the expectation is that the referring  provider will communicate back to the patient on treatment options  Evaluation and Treatment: the  expectation is that the referred to provider will communicate back to the patient on treatment  options  eval and treat for L knee pain, no injury, more LCL vs MCL area  Status: Hold For - Scheduling   Requested for: 75FMG2493  Care Summary provided  : Yes  PMH: Acute URI    · Fluticasone Propionate 50 MCG/ACT Nasal Suspension; USE 1 TO 2 SPRAYS IN EACH  NOSTRIL ONCE DAILY    Discussion/Summary  Discussion Summary:   Heat or cold compress for relief  refilled rx:  Ibuprofen 6oomg to take as directed if L knee pain  for her Bipolar- pt never found any MHMR, social servicces consulted to assist pt for TELECARE [facility] for meds/councelling   already had flushot  if knee not better after PT- RTO for referr to ortho clinic  Chief Complaint  Chief Complaint Free Text Note Form: here for L knee pain  began about 2 weeks   she denies any injury or trauma to the knee  hurts all of the time, she points to around the entire knee/patella area      History of Present Illness  Hospital Based Practices Required Assessment:   Pain Assessment   the patient states they have pain  The pain is located in the PT [address]  The patient describes the pain as throbbing  (on a scale of 0 to 10, the patient rates the pain at 6 )   Ochoa-Baker FACES Pain Rating Scale Children >3 Score: 6  Prefered Language is  Citizen of [place]  Primary Language is  Citizen of [place]    HPI: as above  took OTC Aleve for pain- helped a little bit  did not go to ER, applied [employer] ointment  hurts more with flexion than extension      Review of Systems  Complete-Female:   Constitutional: as noted in HPI  Musculoskeletal: arthralgias and L knee pain, pt did gain about 10 lbs in past 5-6 months, but as noted in HPI  ROS Reviewed:   ROS reviewed  Active Problems    1  Abnormal weight gain (783 1) (R63 5)   2  Bipolar I disorder (296 7) (F31 9)   3  Fatigue (780 79) (R53 83)   4  Need for diphtheria-tetanus-pertussis (Tdap) vaccine, adult/adolescent (V06 1) (Z23)   5  Need for prophylactic vaccination and inoculation against influenza (V04 81) (Z23)   6  Obesity (278 00) (E66 9)    Past Medical History    1  History of Acute URI (465 9) (J06 9)   2  History of Right flank discomfort (789 09) (R10 9)    Surgical History    1  History of Cholecystectomy Laparoscopic   2  History of Removal Of Urinary Calculus   3  History of Tubal Ligation    Family History  Mother    1  Family history of depression (V17 0) (Z81 8)  Father    2   Family history of diabetes mellitus (V18 0) (Z83 3)    Social History    · Daily caffeine consumption   · Family stress (V61 9) (Z63 9)   · Former smoker (V15 82) (F95 910)   · Four children   · Inadequate exercise (V69 0) (Z72 3)   · Part-time employment   ·  (V61 03) (Z63 5)  Social History Reviewed: The social history was reviewed and updated today  Current Meds   1  Daily Multivitamin TABS; Therapy: (Recorded:32Uyi2750) to Recorded   2  Fluticasone Propionate 50 MCG/ACT Nasal Suspension; USE 1 TO 2 SPRAYS IN EACH NOSTRIL   ONCE DAILY; Therapy: 87XIR8572 to (Evaluate:27Oct2016)  Requested for: 22FNJ3706; Last Rx:20Ejp6399   Ordered   3  Ibuprofen 600 MG Oral Tablet; TOME 1 TABLET POR VIA ORAL TODOS 8 HOURS AS NEEDED FOR   PAINTAKE 1 TABLET BY MOUTH EVERY 8 HOURS AS NEEDED FOR PAIN;   Therapy: 29MAH7529 to (Evaluate:22Jan2017)  Requested for: 44XTF7798; Last Rx:23Nov2016   Ordered    Allergies    1  No Known Drug Allergies    2  No Known Environmental Allergies   3  No Known Food Allergies    Vitals  Vital Signs    Recorded: 09SST1477 10:35AM   Temperature 98 2 F   Heart Rate 72   Systolic 671   Diastolic 82   Height 5 ft 2 5 in   Weight 202 lb 13 15 oz   BMI Calculated 36 51   BSA Calculated 1 93     Physical Exam    Constitutional   General appearance: Abnormal   obese middle aged hisp female in NAD  Neck   Neck: Supple, symmetric, trachea midline, no masses  Thyroid: Normal, no thyromegaly  Pulmonary   Respiratory effort: No increased work of breathing or signs of respiratory distress  Auscultation of lungs: Clear to auscultation  Cardiovascular   Auscultation of heart: Normal rate and rhythm, normal S1 and S2, no murmurs  Pedal pulses: 2+ bilaterally  Musculoskeletal   Gait and station: Normal     Digits and nails: Normal without clubbing or cyanosis  Joints, bones, and muscles: Abnormal   painful ROM and tenderness L LCL area > R MCL area, no deformity     Range of motion: Abnormal   pain with flexion and extension and Int/Ext rotation  Psychiatric   Judgment and insight: Normal     Mood and affect: Abnormal   Mood and Affect: depressed  Attending Note  Collaborating Physician Note: Collaborating Physician: I agree with the Advanced Practitioner note  Signatures   Electronically signed by : SATHYA Tellez;  Dec  8 2016 10:58AM EST                       (Author)    Electronically signed by : Deejay Lyons DO; Dec  8 2016 10:59AM EST                       (Review)

## 2018-01-15 NOTE — PROGRESS NOTES
Chief Complaint  PT  Low Bolton INTO THE OFFICE TODAY FOR A NURSE VISIT FOR A URINE DIP TO CHECK FOR BLOOD ORDERED BY LOYDA [address] [place] [address] 2/24/17  RESULTS SENT TO LOYDA AND HE ASKED FOR SEND OUT TO LAB FOR C/S      Active Problems    1  Abnormal weight gain (783 1) (R63 5)   2  Adenitis, acute (683) (L04 9)   3  Bipolar I disorder (296 7) (F31 9)   4  Fatigue (780 79) (R53 83)   5  Hematuria (599 70) (R31 9)   6  Knee pain, right (719 46) (M25 561)   7  Need for diphtheria-tetanus-pertussis (Tdap) vaccine, adult/adolescent (V06 1) (Z23)   8  Need for prophylactic vaccination and inoculation against influenza (V04 81) (Z23)   9  Obesity (278 00) (E66 9)    Current Meds   1  Adipex-P 37 5 MG Oral Capsule; Therapy: (Recorded:72Hhp3641) to Recorded   2  Cepacol LOZG; Therapy: (Recorded:15Sja2752) to Recorded   3  Daily Multivitamin TABS; Therapy: (Recorded:43Usx4360) to Recorded   4  Fluticasone Propionate 50 MCG/ACT Nasal Suspension; USE 1 TO 2 SPRAYS IN EACH   NOSTRIL ONCE DAILY; Therapy: 54LPR2419 to (51-30-20-57)  Requested for: 47YBH2018; Last   Rx:18Zts9297 Ordered   5  HydrOXYzine HCl - 25 MG Oral Tablet; Therapy: (Recorded:57Obi3983) to Recorded   6  Ibuprofen 600 MG Oral Tablet; TOME 1 TABLET POR VIA ORAL TODOS 8 HOURS AS   NEEDED FOR PAINTAKE 1 TABLET BY MOUTH EVERY 8 HOURS AS NEEDED FOR   PAIN;   Therapy: 14LOV8336 to (Evaluate:34Pbq2049)  Requested for: 13JOJ3089; Last   Rx:98Qkx9998 Ordered   7  Topiramate 25 MG Oral Tablet; Therapy: (Recorded:78Esw6594) to Recorded    Allergies    1  No Known Drug Allergies    2  No Known Environmental Allergies   3   No Known Food Allergies    Results/Data  (1) URINALYSIS w URINE C/S REFLEX (will reflex a microscopy if leukocytes, occult blood, or nitrites are not within normal limits) 13EXX5315 02:22PM Miah Taylor Order Number: [id number]     Test Name Result Flag Reference   COLOR Yellow     CLARITY Turbid     PH UA 7 5  4 5-8 0   LEUKOCYTE ESTERASE UA Small A Negative   NITRITE UA Negative  Negative   PROTEIN UA Negative mg/dl  Negative   GLUCOSE UA Negative mg/dl  Negative   KETONES UA Negative mg/dl  Negative   UROBILINOGEN UA 0 2 E U /dl  0 2, 1 0 E U /dl   BILIRUBIN UA Negative  Negative   BLOOD UA Negative  Negative   SPECIFIC GRAVITY UA 1 012  1 003-1 030   BACTERIA Occasional /hpf  None Seen, Occasional   EPITHELIAL CELLS Occasional /hpf  None Seen, Occasional   HYALINE CASTS None Seen /lpf  None Seen   RBC UA None Seen /hpf  None Seen   WBC UA 4-10 /hpf A None Seen       Plan  Hematuria    · (1) URINALYSIS w URINE C/S REFLEX (will reflex a microscopy if leukocytes, occult  blood, or nitrites are not within normal limits); Status:Resulted - Requires Verification;    Done: 69DEM7647 02:22PM    Signatures   Electronically signed by : Edson Fu, ; Mar  3 2017  2:45PM EST                       (Author)    Electronically signed by : SATHYA Prabhakar; Mar  3 2017  3:34PM EST                       (Author)    Electronically signed by : AALIYAH Camarena ; Mar  5 2017  9:09AM EST                       (Author)    Electronically signed by : Reji Curry DO; Mar  5 2017 11:19AM EST                       (Review)

## 2018-01-16 NOTE — MISCELLANEOUS
Reason For Visit  Reason For Visit Free Text Note Form: [facility]     Case Management Documentation [facility]:   Information obtained from the patient and medical record  She is also dealing with additional issues such as chronic/terminal disease and Depression  Patient is participating in [employer]  Action Plan: supportive counseling/advocacy, information provided and [facility]  plan reviewed  Progress Note  SW has received provider request to assist this 38 y/o female pt with  referral  Pt resides with S/O and 2 dgts  She reports she has been suffering from feelings of depression  She has difficulty eating and has a poor diet  Pt denies any SI/HI  Pt receptive to [facility]  Several agencies offered and pt has chosen [facility]  She has an appointment with them on ! 2/13/16 at 2 PM  Pt reports she drives and has transportation for same  If pt has any worsening symptoms and should develop SI she knows to report to the ED for help  Supportive counseling provided  SW to remain available to assist as indicated  Active Problems    1  Abnormal weight gain (783 1) (R63 5)   2  Bipolar I disorder (296 7) (F31 9)   3  Fatigue (780 79) (R53 83)   4  Knee pain, right (719 46) (M25 561)   5  Need for diphtheria-tetanus-pertussis (Tdap) vaccine, adult/adolescent (V06 1) (Z23)   6  Need for prophylactic vaccination and inoculation against influenza (V04 81) (Z23)   7  Obesity (278 00) (E66 9)    Current Meds   1  Daily Multivitamin TABS; Therapy: (Recorded:62Duq5311) to Recorded   2  Fluticasone Propionate 50 MCG/ACT Nasal Suspension; USE 1 TO 2 SPRAYS IN EACH   NOSTRIL ONCE DAILY; Therapy: 81XLZ0127 to ([phone])  Requested for: 47TKO2985; Last   Rx:25Wuj5071 Ordered   3   Ibuprofen 600 MG Oral Tablet; TOME 1 TABLET POR VIA ORAL TODOS 8 HOURS AS   NEEDED FOR PAINTAKE 1 TABLET BY MOUTH EVERY 8 HOURS AS NEEDED FOR   PAIN;   Therapy: 16JYI9435 to (Evaluate:04Iai1323)  Requested for: 84FFO1171; Last   Rx:12Kbj3394 Ordered    Allergies    1  No Known Drug Allergies    2  No Known Environmental Allergies   3   No Known Food Allergies    Signatures   Electronically signed by : Vaughn Camara LCSW; Dec  8 2016 11:28AM EST                       (Author)

## 2018-03-11 ENCOUNTER — APPOINTMENT (EMERGENCY)
Dept: RADIOLOGY | Facility: HOSPITAL | Age: 43
End: 2018-03-11
Payer: COMMERCIAL

## 2018-03-11 ENCOUNTER — HOSPITAL ENCOUNTER (EMERGENCY)
Facility: HOSPITAL | Age: 43
Discharge: HOME | End: 2018-03-11
Attending: EMERGENCY MEDICINE
Payer: COMMERCIAL

## 2018-03-11 VITALS
WEIGHT: 170 LBS | BODY MASS INDEX: 30.11 KG/M2 | TEMPERATURE: 99.1 F | RESPIRATION RATE: 18 BRPM | HEART RATE: 89 BPM | DIASTOLIC BLOOD PRESSURE: 83 MMHG | SYSTOLIC BLOOD PRESSURE: 150 MMHG | OXYGEN SATURATION: 100 %

## 2018-03-11 DIAGNOSIS — J06.9 VIRAL URI WITH COUGH: Primary | ICD-10-CM

## 2018-03-11 PROCEDURE — 71046 X-RAY EXAM CHEST 2 VIEWS: CPT

## 2018-03-11 PROCEDURE — 99283 EMERGENCY DEPT VISIT LOW MDM: CPT

## 2018-03-11 RX ORDER — ACETAMINOPHEN 325 MG/1
650 TABLET ORAL ONCE
Status: COMPLETED | OUTPATIENT
Start: 2018-03-11 | End: 2018-03-11

## 2018-03-11 RX ADMIN — ACETAMINOPHEN 650 MG: 325 TABLET, FILM COATED ORAL at 07:38

## 2018-03-11 NOTE — DISCHARGE INSTRUCTIONS
Continue to take tylenol/motrin as needed for fever and sore throat  Consider taking mucinex for your nasal congestion which you can buy over the counter  Follow up with your PCP  If your symptoms acutely worsen, please return to the ED  Bronquitis aguda   LO QUE NECESITA SABER:   La bronquitis aguda es la inflamación e irritación de serenity vías respiratorias  Esta irritación puede provocar que tosa o que tenga otros problemas de respiración  La bronquitis aguda suele comenzar debido a otra enfermedad, wander un resfriado o la gripe  La enfermedad se propaga de cee nariz y garganta a cee tráquea y Carola   La bronquitis a menudo es conocida wander resfriado de pecho  La bronquitis aguda generalmente dura alrededor de 3 a 6 semanas y no es rod enfermedad grave  La tos podría durar por varias semanas  INSTRUCCIONES SOBRE [facility]:   Regrese a la kinsey de emergencias si:   · Usted expectora russ  · Serenity labios o uñas de los dedos se ponen azules  · Usted siente que no está recibiendo suficiente aire cuando respira  Pregúntele a cee Terrilyn Hesham vitaminas y minerales son adecuados para usted  · Usted tiene fiebre  · Serenity problemas respiratorios no desaparecen o empeoran  · Cee tos no mejora dentro de 4 semanas  · Usted tiene preguntas o inquietudes acerca de cee condición o cuidado  Cuidados personales:   · Descanse más  El descanso ayuda a cee cuerpo a sanar  Empiece a hacer un poco más día a día  Descanse cuando usted sienta que es necesario  · Evite irritantes en el aire  Evite productos químicos, gases y polvo  Use rod mascarilla si tiene que trabajar alrededor de polvo o gases  Permanezca dentro cuando los niveles de contaminación ortega altos  Si tiene alergias, permanezca adentro cuando el conteo de polen sea CROSSCANONBY  No use productos en aerosol, wander desodorante, aerosol contra los insectos y aerosol para el lacy  · No fume o esté alrededor de personas que fuman    [place] nicotina y otros químicos en los cigarrillos y cigarros dañan la cilia que saca la mucosidad de migdalia pulmones  Pida información a cee médico si usted actualmente fuma y necesita ayuda para dejar de fumar  Los cigarrillos electrónicos o tabaco sin humo todavía contienen nicotina  Consulte con cee médico antes de [employer]  · [address] se le haya indicado  Los líquidos ayudan a mantener humectadas migdalia vías respiratorias y ayudarle a eliminar las flemas por medio de la tos  Es posible que usted necesite gisselle más líquidos si tiene bronquitis [place]  Pregunte cuánto líquido debe gisselle cada día y cuáles líquidos son los más adecuados para usted  · Use un humidificador o vaporizador  Use un humidificador de betzaida frío o un vaporizador para elevar la humedad en cee casa  [place] podría ayudarle a respirar más fácilmente y al mismo tiempo disminuir la tos  Disminuya cee riesgo para la bronquitis aguda:   · Vaya a que le pongan las vacunas necesarias  Pregúntele a cee médico si usted debería ser vacunado contra la gripe o la neumonía  · Evite la propagación de gérmenes  Usted puede disminuir cee riesgo de bronquitis [place] y otras enfermedades de la siguiente manera:     ¨ Jovani frecuentemente con agua y Eric  Lleve rod loción o gel antiséptico para las shamir  Usted puede usar la loción o el gel para limpiar migdalia shamir cuando no haya agua disponible  ¨ No se toque los ojos, la nariz o la boca a menos que se haya lavado las shamir cyn  ¨ Siempre cubra cee boca al toser para evitar la propagación de gérmenes  Lo mejor es toser en un pañuelo desechable o en la manga de cee camisa, en lugar de en cee mano  Pídale a los que le rodean que cubran migdalia bocas al toser  ¨ Trate de evitar a las personas que están resfriadas o tienen gripe  Si usted está enfermo, manténgase alejado de otras personas lo más que pueda    Medicamentos:  Cee médico podría  darle cualquiera de lo siguiente:  · El ibuprofeno o el acetaminofeno  son medicamentos que pueden ayudar a bajar cee fiebre  Están disponibles sin receta médica  Consulte con cee médico cuál medicamento es el adecuado para usted  Pregunte la cantidad y la frecuencia con que debe tomarlos  [address]  Estos medicamentos pueden provocar sangrado estomacal si no se eyad correctamente  El ibuprofeno puede provocar daño al [place] Pedraza  No tome ibuprofeno si usted tiene enfermedad de los riñones, Galina Ates o si es alérgico a la aspirina  El acetaminofeno puede dañar el hígado  No tome más de 4,000 miligramos en 24 horas  · Los descongestionantes  ayudan a despejar la mucosidad en migdalia pulmones y que sea más fácil expectorarla  [place] puede ayudarle a respirar mejor  · Los jarabes para la tos  disminuyen cee necesidad de toser  Si cee tos produce mucosidad, no tome un supresor de la tos a menos que cee médico se lo indique  Cee médico podría sugerirle que tome un supresor de la tos en la noche para que pueda descansar  · Inhaladores  podrían ser [place]  Cee médico podría darle rosendo o más inhaladores para ayudarle a respirar más fácil y Eren Hammers menos tos  Un inhalador le administra medicamento para abrir migdalia vías aéreas  Pídale a cee médico que le muestre cómo usar cee inhalador correctamente  · [place] migdalia medicamentos wander se le haya indicado  Consulte con cee médico si usted ivonne que cee medicamento no le está ayudando o si presenta efectos secundarios  Infórmele si es alérgico a algún medicamento  Mantenga rod lista actualizada de los [employer], las vitaminas y los productos herbales que yohannes  Incluya los siguientes datos de los medicamentos: cantidad, frecuencia y motivo de administración  Traiga con usted la lista o los envases de la píldoras a migdalia citas de seguimiento  Lleve la lista de los medicamentos con usted en jaquelin de rod emergencia  Acuda a migdalia consultas de control con cee médico según le indicaron    Escriba las preguntas que tenga para que recuerde hacerlas daniel migdalia citas de seguimiento  © 2017 [address] Information is for End User's use only and may not be sold, redistributed or otherwise used for commercial purposes  All illustrations and images included in CareNotes® are the copyrighted property of A D A M , Inc  or Jose Maria Toscano  Esta información es sólo para uso en educación  Recio intención no es darle un consejo médico sobre enfermedades o tratamientos  Colsulte con recio Laruth Chava farmacéutico antes de seguir cualquier régimen médico para saber si es seguro y efectivo para usted

## 2018-03-11 NOTE — ED ATTENDING ATTESTATION
Alexis Muñiz MD, saw and evaluated the patient  I have discussed the patient with the resident/non-physician practitioner and agree with the resident's/non-physician practitioner's findings, Plan of Care, and MDM as documented in the resident's/non-physician practitioner's note, except where noted  All available labs and Radiology studies were reviewed  At this point I agree with the current assessment done in the Emergency Department  I have conducted an independent evaluation of this patient a history and physical is as follows:      Critical Care Time  CritCare Time  OA: 39 y/o f c/o URI sxms since [address]  Pt notes that she has had sore throat, congestion, cough occasionally productive of yellow sputum as well as frontal headaches  Chest pain with cough only  No SOB/DUDLEY otherwise  Pt has had mild relief with motrin at home  Subjective fevers, has not taken her temperature  Did not receive a flu shot this year  Works in a warehouse with unknown sick contacts  Last took motrin yesterday  PE, well developed f in NAD, VSS/mildly HTN, NC/AT, MMM, clear sclera and conjunctiva, posterior oropharynx very mildly erythematous, no exudate/edema, no pooling of secretions, TM clear b/l with mild cerumen in R canal, boggy turbinates bilateral nares, mild pressure over bilateral maxillary sinuses, neck supple/FROM, mild shoddy anterior cervical LN, no meningismus, RR, lungs CTAB, -w/r, - tachypnea, abd soft, NT/ND, +Bs, -r/g, - LE edema/calf ttp, - rash, intact distal pulses and capillary refill < 2 sec, oriented  A/p viral illness, CXR given sxms x 5 days, although lungs CTAB, continue supportive care, return and f/u precautions     Procedures

## 2018-03-11 NOTE — ED PROVIDER NOTES
History  Chief Complaint   Patient presents with    URI     Pt has been having cough, sore throat, congestion, and fever since Wednesday  Pt last took Motrin 2100 last night     Patient is a 41 yo F who presents for upper respiratory symptoms  Patient says that the symptoms have been present since Wednesday  She admits to a sore throat, cough productive of yellow sputum, nasal congestion and frontal headaches  She says her symptoms have gotten "a little worse "  She has been taking motrin and tamiflu with little relief  She admits to subjective fevers and chills, however, has not taken her temperature  She also admits to some chest pain associated with her cough  She says that she is able to swallow however it is uncomfortable  She denies any other sick contacts  She did not have a flu shot this year  She denies ear pain, abdominal pain, vomiting, diarrhea or blood in her stools  None       History reviewed  No pertinent past medical history  Past Surgical History:   Procedure Laterality Date     SECTION      CHOLECYSTECTOMY      KIDNEY STONE SURGERY         History reviewed  No pertinent family history  I have reviewed and agree with the history as documented  Social History   Substance Use Topics    Smoking status: Never Smoker    Smokeless tobacco: Never Used    Alcohol use No        Review of Systems   Constitutional: Positive for chills and fever  Negative for diaphoresis  HENT: Positive for congestion, sore throat and trouble swallowing  Negative for ear discharge, ear pain and sinus pressure  Eyes: Negative for pain, discharge and itching  Respiratory: Positive for cough, chest tightness and shortness of breath  Negative for wheezing  Cardiovascular: Negative for chest pain, palpitations and leg swelling  Gastrointestinal: Positive for nausea  Negative for abdominal distention, abdominal pain, blood in stool, diarrhea and vomiting     Endocrine: Negative for polyphagia and polyuria  Genitourinary: Negative for difficulty urinating, dysuria, flank pain, hematuria, pelvic pain and vaginal bleeding  Musculoskeletal: Negative for arthralgias and back pain  Skin: Negative for color change and rash  Neurological: Positive for light-headedness  Negative for dizziness, syncope, weakness and headaches  Physical Exam  ED Triage Vitals [03/11/18 0704]   Temperature Pulse Respirations Blood Pressure SpO2   99 1 °F (37 3 °C) 89 18 150/83 100 %      Temp Source Heart Rate Source Patient Position - Orthostatic VS BP Location FiO2 (%)   Oral Monitor Sitting Left arm --      Pain Score       6           Orthostatic Vital Signs  Vitals:    03/11/18 0704   BP: 150/83   Pulse: 89   Patient Position - Orthostatic VS: Sitting       Physical Exam   Constitutional: She is oriented to person, place, and time  She appears well-developed and well-nourished  No distress  HENT:   Head: Normocephalic and atraumatic  Right Ear: External ear normal    Left Ear: External ear normal    Mouth/Throat: Oropharynx is clear and moist  No oropharyngeal exudate  Eyes: Conjunctivae are normal  Pupils are equal, round, and reactive to light  Right eye exhibits no discharge  Left eye exhibits no discharge  Neck: Neck supple  Cardiovascular: Normal rate, regular rhythm and normal heart sounds  Exam reveals no gallop and no friction rub  No murmur heard  Pulmonary/Chest: Effort normal and breath sounds normal  No respiratory distress  She has no wheezes  She has no rales  Abdominal: Soft  She exhibits no distension  There is no tenderness  There is no guarding  Musculoskeletal: Normal range of motion  She exhibits no edema or deformity  Lymphadenopathy:     She has cervical adenopathy  Neurological: She is alert and oriented to person, place, and time  Skin: Skin is warm and dry  No rash noted  Psychiatric: She has a normal mood and affect     Nursing note and vitals reviewed  ED Medications  Medications   acetaminophen (TYLENOL) tablet 650 mg (650 mg Oral Given 3/11/18 0738)       Diagnostic Studies  Results Reviewed     None                 XR chest 2 views   Final Result by Brett Ham MD (03/11 5366)      No acute cardiopulmonary disease  Workstation performed: [id number]               Procedures  Procedures      Phone Consults  ED Phone Contact    ED Course  ED Course                                MDM  Number of Diagnoses or Management Options  Diagnosis management comments: 39 yo F who presents for URI symptoms  Subjective fevers and chills  Sore throat, productive cough, nasal congestion, frontal headache  Plan: CXR to r/o post viral pnuemonia, Tylenol for sore throat      CritCare Time    Disposition  Final diagnoses:   Viral URI with cough     Time reflects when diagnosis was documented in both MDM as applicable and the Disposition within this note     Time User Action Codes Description Comment    3/11/2018  7:57 AM Taniya Teixeira Add [J06 9,  B97 89] Viral URI with cough       ED Disposition     ED Disposition Condition Comment    Discharge  Aj Thomas 1498 discharge to home/self care  Condition at discharge: Stable        Follow-up Information     Follow up With Specialties Details Why Ryland Cowan MD Internal Medicine  for follow up  [address] [place] [address]  [phone]          Discharge Medication List as of 3/11/2018  7:59 AM      CONTINUE these medications which have NOT CHANGED    Details   naproxen (NAPROSYN) 500 mg tablet Take 1 tablet by mouth 2 (two) times a day with meals for 30 days, Starting 4/4/2017, Until Thu 5/4/17, Print           No discharge procedures on file  ED Provider  Attending physically available and evaluated Aj Thomas 2763  I managed the patient along with the ED Attending      Electronically Signed by         Cameron Duffy DO  03/11/18 2959

## 2018-03-13 ENCOUNTER — OFFICE VISIT (OUTPATIENT)
Dept: INTERNAL MEDICINE CLINIC | Facility: CLINIC | Age: 43
End: 2018-03-13
Payer: COMMERCIAL

## 2018-03-13 ENCOUNTER — APPOINTMENT (OUTPATIENT)
Dept: LAB | Facility: HOSPITAL | Age: 43
End: 2018-03-13
Payer: COMMERCIAL

## 2018-03-13 ENCOUNTER — TRANSCRIBE ORDERS (OUTPATIENT)
Dept: LAB | Facility: HOSPITAL | Age: 43
End: 2018-03-13

## 2018-03-13 VITALS
BODY MASS INDEX: 31.41 KG/M2 | DIASTOLIC BLOOD PRESSURE: 64 MMHG | HEIGHT: 63 IN | HEART RATE: 68 BPM | WEIGHT: 177.25 LBS | TEMPERATURE: 97.8 F | SYSTOLIC BLOOD PRESSURE: 132 MMHG

## 2018-03-13 DIAGNOSIS — Z11.3 SCREENING EXAMINATION FOR VENEREAL DISEASE: Primary | ICD-10-CM

## 2018-03-13 DIAGNOSIS — Z11.3 SCREENING EXAMINATION FOR VENEREAL DISEASE: ICD-10-CM

## 2018-03-13 DIAGNOSIS — J06.9 VIRAL UPPER RESPIRATORY TRACT INFECTION: Primary | ICD-10-CM

## 2018-03-13 DIAGNOSIS — H10.32 ACUTE CONJUNCTIVITIS OF LEFT EYE, UNSPECIFIED ACUTE CONJUNCTIVITIS TYPE: ICD-10-CM

## 2018-03-13 PROBLEM — R31.29 MICROSCOPIC HEMATURIA: Status: ACTIVE | Noted: 2017-02-10

## 2018-03-13 PROBLEM — A60.04 HERPETIC ULCERATION OF VULVA: Status: ACTIVE | Noted: 2018-03-13

## 2018-03-13 PROBLEM — L30.9 VULVAR DERMATITIS: Status: ACTIVE | Noted: 2017-06-07

## 2018-03-13 PROBLEM — G43.109 MIGRAINE WITH AURA AND WITHOUT STATUS MIGRAINOSUS, NOT INTRACTABLE: Status: ACTIVE | Noted: 2017-04-10

## 2018-03-13 PROBLEM — L04.9 ADENITIS, ACUTE: Status: ACTIVE | Noted: 2017-02-24

## 2018-03-13 PROCEDURE — 99214 OFFICE O/P EST MOD 30 MIN: CPT | Performed by: NURSE PRACTITIONER

## 2018-03-13 PROCEDURE — 86592 SYPHILIS TEST NON-TREP QUAL: CPT

## 2018-03-13 PROCEDURE — 86803 HEPATITIS C AB TEST: CPT

## 2018-03-13 PROCEDURE — 3725F SCREEN DEPRESSION PERFORMED: CPT | Performed by: NURSE PRACTITIONER

## 2018-03-13 PROCEDURE — 36415 COLL VENOUS BLD VENIPUNCTURE: CPT

## 2018-03-13 PROCEDURE — 87389 HIV-1 AG W/HIV-1&-2 AB AG IA: CPT

## 2018-03-13 RX ORDER — TOPIRAMATE 25 MG/1
25 TABLET ORAL
COMMUNITY

## 2018-03-13 RX ORDER — IBUPROFEN 600 MG/1
TABLET ORAL
COMMUNITY
Start: 2016-05-10 | End: 2018-07-13

## 2018-03-13 RX ORDER — ERGOCALCIFEROL (VITAMIN D2) 10 MCG
TABLET ORAL
COMMUNITY
End: 2018-07-13

## 2018-03-13 RX ORDER — NAPROXEN 500 MG/1
500 TABLET ORAL
COMMUNITY
Start: 2017-05-05 | End: 2018-05-05

## 2018-03-13 RX ORDER — VALACYCLOVIR HYDROCHLORIDE 500 MG/1
500 TABLET, FILM COATED ORAL
COMMUNITY
Start: 2018-03-13 | End: 2018-07-13

## 2018-03-13 RX ORDER — DEXTROMETHORPHAN HYDROBROMIDE AND PROMETHAZINE HYDROCHLORIDE 15; 6.25 MG/5ML; MG/5ML
5 SYRUP ORAL 4 TIMES DAILY PRN
Qty: 150 ML | Refills: 0 | Status: SHIPPED | OUTPATIENT
Start: 2018-03-13 | End: 2018-07-13

## 2018-03-13 RX ORDER — PHENTERMINE HYDROCHLORIDE 37.5 MG/1
CAPSULE ORAL
COMMUNITY
End: 2018-07-13

## 2018-03-13 RX ORDER — GENTAMICIN SULFATE 3 MG/ML
1 SOLUTION/ DROPS OPHTHALMIC EVERY 4 HOURS
Qty: 5 ML | Refills: 0 | Status: SHIPPED | OUTPATIENT
Start: 2018-03-13 | End: 2018-07-13

## 2018-03-13 RX ORDER — CLOBETASOL PROPIONATE 0.5 MG/G
OINTMENT TOPICAL
COMMUNITY
Start: 2017-07-14 | End: 2018-07-13

## 2018-03-13 RX ORDER — HYDROCHLOROTHIAZIDE 25 MG/1
25 TABLET ORAL
COMMUNITY
End: 2018-07-13

## 2018-03-13 NOTE — PROGRESS NOTES
Assessment/Plan:      Diagnoses and all orders for this visit:    Viral upper respiratory tract infection  -     promethazine-dextromethorphan (PHENERGAN-DM) 6 25-15 mg/5 mL oral syrup; Take 5 mL by mouth 4 (four) times a day as needed for cough  -     Patient is advised she likely has a viral infection and it will run its course  -     Cover your cough, and wash hands frequently    Acute conjunctivitis of left eye, unspecified acute conjunctivitis type  -     gentamicin (GARAMYCIN) 0 3 % ophthalmic solution; Administer 1 drop into the left eye every 4 (four) hours  -     Conjunctivitis can be very contagious; please remember to wash hands frequently          Subjective: Presents to the clinic clinic for URI and pink eye     Patient ID: Cassandra Atkins is a 40 y o  female  HPI  She reports she started having pink a pink eye after leaving the ED 2 days ago  Symptoms including mild pain and sensitivity to light, eye discharge when she wakes up in the Am, but no change in vision  Denies contact with any known persons infected with conjunctivitis  She was at the ED for upper resp infection  CXR was negative for active cardiopulmonary disease  She however continues to cough, with CP when coughing a lot, no wheezing but SOB when coughing a lot  No Vomiting or diarrhea  However intermittent nausea  Did not check her temp at home, which today at the clinic is WNL  The following portions of the patient's history were reviewed and updated as appropriate: allergies, current medications, past family history, past medical history, past social history, past surgical history and problem list     Review of Systems   Constitutional: Positive for appetite change and chills  Negative for fatigue and fever  HENT: Positive for congestion (nose), rhinorrhea, sinus pressure, sore throat and trouble swallowing  Negative for ear pain and tinnitus  Respiratory: Positive for cough   Negative for chest tightness, shortness of breath and wheezing  Cardiovascular: Positive for chest pain (only when coughing alot)  Negative for palpitations  Gastrointestinal: Positive for nausea  Negative for abdominal pain, diarrhea and vomiting  Musculoskeletal: Positive for myalgias  Neurological: Positive for dizziness (when coughing alot)  Negative for syncope, weakness and light-headedness  Objective:    /64 (BP Location: Left arm, Patient Position: Sitting, Cuff Size: Adult)   Pulse 68   Temp 97 8 °F (36 6 °C) (Oral)   Ht 5' 3" (1 6 m)   Wt 80 4 kg (177 lb 4 oz)   BMI 31 40 kg/m²      Physical Exam   Constitutional: She is oriented to person, place, and time  She appears well-developed and well-nourished  No distress  HENT:   Head: Normocephalic and atraumatic  Right Ear: External ear normal    Left Ear: External ear normal    Eyes: EOM are normal  Pupils are equal, round, and reactive to light  Right eye exhibits no discharge  Left eye exhibits discharge (left conjunctival erythema, with mild yellowish discharge on the inner canthus)  Cardiovascular: Normal rate, regular rhythm and normal heart sounds  No murmur heard  Pulmonary/Chest: Effort normal  No respiratory distress  She has no wheezes  Neurological: She is alert and oriented to person, place, and time  Skin: She is not diaphoretic  Psychiatric: She has a normal mood and affect   Her behavior is normal  Judgment and thought content normal

## 2018-03-13 NOTE — PATIENT INSTRUCTIONS
Conjuntivitis   CUIDADO AMBULATORIO:   La conjuntivitis,  es la inflamación de la conjuntiva  La conjuntiva es el tejido bledsoe que cubre la parte frontal de cee margarito y la parte posterior de migdalia párpados  La conjuntiva ayuda a proteger cee margarito y a mantenerlo húmedo  La conjuntivitis podría ser a causa de rod bacteria, alergias o de un virus  Si cee conjuntivitis es a causa de ord bacteria, podría mejorar por sí buffy en aproximadamente 7 días  La conjuntivitis viral puede durar hasta 3 semanas  Los signos comúnes podrían incluir lo siguiente:  Usted usualmente tendrá síntomas en ambos ojos si cee conjuntivitis es a causa de las [place]  También podría tener otros síntomas de [place], wander comezón o escurrimiento nasal  Los síntomas usualmente empiezan en 1 margarito si la conjuntivitis es a causa de un virus o bacteria  · Enrojecimiento en la parte cullen de cee margarito    · Comezón en cee margarito o alrededor de cee margarito    · Sensación que tiene algo dentro del margarito    · Secreción pegajosa, [place] o espesa    · Párpados con [place] cuando se despierta en la mañana    · Ardor, escozor o inflamación en cee margarito    · Dolor cuando ve la katie brillante  Busque atención médica de inmediato si:   · Cee dolor de margarito empeora  · La inflamación en migdalia ojos empeora, aún después del tratamiento  · Cee visión empeora repentinamente o usted no puede geovanna en lo absoluto  Pregúntele a cee [place] vitaminas y minerales son adecuados para usted  · Usted presenta fiebre o dolor de oído  · Usted tiene bultos o manchas de russ pequeñas en cee margarito  · Usted tiene preguntas o inquietudes acerca de cee condición o cuidado  El tratamiento  dependerá de la causa de cee conjuntivitis  Es posible que usted necesite antibióticos o medicamentos para la alergia wander [place], gotas o pomadas para los ojos  [place] de cee síntomas:   · Aplique rod compresa fría  Moje rod toalla con agua fría y colóquela sobre cee margarito   Custar ayuda a disminuir la comezón e irritación  · No use lentes de contacto  Ellos podrían irritar migdalia ojos  Deseche el par que está usando y pregunte cuándo puede usarlos otra vez  Use un par de lentes nuevos cuando cee médico lo autorice  · Evite los irritantes  Aléjese de las áreas llenas de humo  Proteja migdalia ojos del aire y del sol  · Enjuague cee margarito  Es posible que necesite enjuagar cee margarito con solución salina para ayudar a disminuir migdalia síntomas  Pida más información sobre cómo enjuagar cee margarito  Medicamentos:  El tratamiento depende de lo que está provocando la conjuntivitis  A usted  podrían  darle alguno de lo siguientes:  · Medicamentos para la alergia  ayuda a disminuir la comezón, el enrojecimiento y la inflamación de migdalia ojos a causa de las alergias  Se lo podrían sven en forma de píldora, gotas para los ojos o atomizador nasal     · Antibióticos  podrían ser necesarios si cee conjuntivitis es a causa de rod bacteria  Juanita medicamento podría ser en forma de píldora, gotas o pomada para los ojos  · [place] migdalia medicamentos wander se le haya indicado  Consulte con cee médico si usted ivonne que cee medicamento no le está ayudando o si presenta efectos secundarios  Infórmele si es alérgico a cualquier medicamento  Mantenga rod lista actualizada de los [place], las vitaminas y los productos herbales que yohannes  Incluya los siguientes datos de los medicamentos: cantidad, frecuencia y motivo de administración  Traiga con usted la lista o los envases de la píldoras a migdalia citas de seguimiento  Lleve la lista de los medicamentos con usted en jaquelin de rod emergencia  Evite la propagación de la conjuntivitis:   · Lávese migdalia shamir con jabón y agua con frecuencia  Lávese las shamir antes y después de tocarse los ojos  También lávese migdalia shamir antes de preparar o comer alimentos y después de usar el baño o cambiar un pañal     · Evite los alérgenos  Trate de evitar las cosas que le provoquen alergias, wander las [place], polvo o pasto  · Evite el contacto con [employer]  No comparta las toallas o paños  Trate de permanecer lejos de otras personas tanto wandre sea posible  Pregunte cuándo puede regresar al Margurette [place]ter o a clase  · Deseche el maquillaje de ojos  La bacteria que provoca la conjuntivitis puede estar en el maquillaje de ojos  Lisa Comas rimel y otros maquillajes de ojos  © 2017 [address] Information is for End User's use only and may not be sold, redistributed or otherwise used for commercial purposes  All illustrations and images included in CareNotes® are the copyrighted property of A [place] [employer] , Inc  or Jose Maria Toscano  Esta información es sólo para uso en educación  Recio intención no es darle un consejo médico sobre enfermedades o tratamientos  Colsulte con recio Brittaney Bourdon farmacéutico antes de seguir cualquier régimen médico para saber si es seguro y efectivo para usted

## 2018-03-13 NOTE — LETTER
March 13, 2018     Patient: Janene Cm   YOB: 1975   Date of Visit: 3/13/2018       To Whom it May Concern:    Sonia Wilkerson is under my professional care  She was seen in my office on 3/13/2018  She may return to work on 3/14/2018  If you have any questions or concerns, please don't hesitate to call           Sincerely,          SATHYA Lawrence        CC: No Recipients

## 2018-03-14 LAB
HCV AB SER QL: NORMAL
HIV 1+2 AB+HIV1 P24 AG SERPL QL IA: NORMAL
RPR SER QL: NORMAL

## 2018-07-13 ENCOUNTER — HOSPITAL ENCOUNTER (EMERGENCY)
Facility: HOSPITAL | Age: 43
Discharge: HOME | End: 2018-07-13
Payer: COMMERCIAL

## 2018-07-13 ENCOUNTER — APPOINTMENT (EMERGENCY)
Dept: RADIOLOGY | Facility: HOSPITAL | Age: 43
End: 2018-07-13
Payer: COMMERCIAL

## 2018-07-13 VITALS
HEART RATE: 68 BPM | OXYGEN SATURATION: 100 % | BODY MASS INDEX: 31.41 KG/M2 | DIASTOLIC BLOOD PRESSURE: 89 MMHG | RESPIRATION RATE: 18 BRPM | HEIGHT: 63 IN | TEMPERATURE: 97.9 F | SYSTOLIC BLOOD PRESSURE: 151 MMHG | WEIGHT: 177.25 LBS

## 2018-07-13 DIAGNOSIS — M62.830 BACK MUSCLE SPASM: Primary | ICD-10-CM

## 2018-07-13 PROCEDURE — 99283 EMERGENCY DEPT VISIT LOW MDM: CPT

## 2018-07-13 PROCEDURE — 72100 X-RAY EXAM L-S SPINE 2/3 VWS: CPT

## 2018-07-13 PROCEDURE — 96372 THER/PROPH/DIAG INJ SC/IM: CPT

## 2018-07-13 RX ORDER — TRAMADOL HYDROCHLORIDE 50 MG/1
50 TABLET ORAL EVERY 6 HOURS PRN
Qty: 20 TABLET | Refills: 0 | Status: SHIPPED | OUTPATIENT
Start: 2018-07-13 | End: 2018-07-18

## 2018-07-13 RX ORDER — CYCLOBENZAPRINE HCL 10 MG
10 TABLET ORAL ONCE
Status: COMPLETED | OUTPATIENT
Start: 2018-07-13 | End: 2018-07-13

## 2018-07-13 RX ORDER — CYCLOBENZAPRINE HCL 10 MG
10 TABLET ORAL 3 TIMES DAILY
Qty: 20 TABLET | Refills: 0 | Status: SHIPPED | OUTPATIENT
Start: 2018-07-13 | End: 2018-07-18

## 2018-07-13 RX ORDER — KETOROLAC TROMETHAMINE 30 MG/ML
30 INJECTION, SOLUTION INTRAMUSCULAR; INTRAVENOUS ONCE
Status: COMPLETED | OUTPATIENT
Start: 2018-07-13 | End: 2018-07-13

## 2018-07-13 RX ADMIN — KETOROLAC TROMETHAMINE 30 MG: 30 INJECTION, SOLUTION INTRAMUSCULAR at 09:38

## 2018-07-13 RX ADMIN — CYCLOBENZAPRINE HYDROCHLORIDE 10 MG: 10 TABLET, FILM COATED ORAL at 09:01

## 2018-07-13 NOTE — ED NOTES
Patient's pain improved - now 4/10  Pt requesting to talk to PA about xray results       Geraldine Bond RN  07/13/18 1016

## 2018-07-13 NOTE — ED PROVIDER NOTES
History  Chief Complaint   Patient presents with    Back Pain     pt with lower back pain started at work yesteday while pushing racks of clothing      Patient is a 71-year-old female reported to emergency room with complaint of lower back pain started yesterday after she moved a heavy clothing rock at work  Patient stated pain is sharp and stabbing intermittent, increased with certain movements and trunk rotation  Explained the as crossing from midline to both sides  Tenderness upon palpation of the lateral right and left lower back side  Denies any prior back injuries  Denies fecal or urine incontinence, paresthesia saddle distribution  No fevers, chills, sweats  No chest pain palpitations, shortness of breath or dizziness  No urgency, frequency, dysuria or hematuria  Past medical history noncontributory  Stable while in ED  X-ray of the lower back shows no acute abnormalities  Etiology of the back pain most likely muscular an inflammatory  Muscle relaxer and tramadol prescribed  I am Toradol and p o  Flexeril given while in emergency room with significant improvement of symptoms  Prior to Admission Medications   Prescriptions Last Dose Informant Patient Reported? Taking? IBUPROFEN PO 2018 at Unknown time  Yes Yes   Sig: Take by mouth   topiramate (TOPAMAX) 25 mg tablet   Yes Yes   Sig: Take 25 mg by mouth      Facility-Administered Medications: None       History reviewed  No pertinent past medical history  Past Surgical History:   Procedure Laterality Date     SECTION      CHOLECYSTECTOMY      KIDNEY STONE SURGERY         History reviewed  No pertinent family history  I have reviewed and agree with the history as documented  Social History   Substance Use Topics    Smoking status: Never Smoker    Smokeless tobacco: Never Used    Alcohol use No        Review of Systems   Constitutional: Negative for chills and fever  HENT: Negative      Respiratory: Negative for chest tightness and shortness of breath  Cardiovascular: Negative for chest pain and palpitations  Gastrointestinal: Negative  Genitourinary: Negative for flank pain, frequency, hematuria, pelvic pain and urgency  Musculoskeletal: Positive for back pain  Negative for gait problem, joint swelling, neck pain and neck stiffness  Skin: Negative  Neurological: Negative for dizziness, weakness, light-headedness, numbness and headaches  Physical Exam  Physical Exam   Constitutional: She is oriented to person, place, and time  She appears well-developed and well-nourished  No distress  HENT:   Head: Normocephalic and atraumatic  Eyes: EOM are normal  Pupils are equal, round, and reactive to light  Neck: Normal range of motion  Neck supple  Cardiovascular: Normal rate and regular rhythm  Pulmonary/Chest: Effort normal and breath sounds normal    Abdominal: Soft  Bowel sounds are normal    Musculoskeletal: She exhibits tenderness  She exhibits no edema or deformity  Neurological: She is alert and oriented to person, place, and time  She displays normal reflexes  No cranial nerve deficit or sensory deficit  Coordination normal    Skin: Skin is warm and dry         Vital Signs  ED Triage Vitals [07/13/18 0824]   Temperature Pulse Respirations Blood Pressure SpO2   97 9 °F (36 6 °C) 68 18 151/89 100 %      Temp Source Heart Rate Source Patient Position - Orthostatic VS BP Location FiO2 (%)   Oral Monitor Sitting Left arm --      Pain Score       9           Vitals:    07/13/18 0824   BP: 151/89   Pulse: 68   Patient Position - Orthostatic VS: Sitting       Visual Acuity      ED Medications  Medications   cyclobenzaprine (FLEXERIL) tablet 10 mg (10 mg Oral Given 7/13/18 0901)   ketorolac (TORADOL) injection 30 mg (30 mg Intramuscular Given 7/13/18 1303)       Diagnostic Studies  Results Reviewed     None                 XR lumbar spine 2 or 3 views   Final Result by Aman Watts DO (07/13 1935)      Unremarkable lumbar spine  Workstation performed: [id number]                    Procedures  Procedures       Phone Contacts  ED Phone Contact    ED Course                               MDM  Number of Diagnoses or Management Options  Back muscle spasm:   Diagnosis management comments: X-ray of the lower back shows no acute abnormalities  IM Toradol and p o  Flexeril given while in emergency room with reported improvement of pain and discomfort  We will start patient on Flexeril and tramadol pain and muscular discomfort  Supportive care such as heat therapy, light stretching and appropriate rest recommended  Follow up with PCP for further evaluation and management as needed  Patient was advised not to operate heavy machinery or drive while in medication  Discharge with no complications  Amount and/or Complexity of Data Reviewed  Tests in the radiology section of CPT®: ordered and reviewed    Patient Progress  Patient progress: improved    CritCare Time    Disposition  Final diagnoses:   Back muscle spasm     Time reflects when diagnosis was documented in both MDM as applicable and the Disposition within this note     Time User Action Codes Description Comment    7/13/2018 10:19 AM Renato Lan Jeffry [[id number]] Back muscle spasm       ED Disposition     ED Disposition Condition Comment    Discharge  [facility]07-A 1498 discharge to home/self care  Condition at discharge: Good    Muscular back pain  We will start course of Flexeril and tramadol for pain and muscle spasm  Supportive care advice such as heat therapy, light stretching, rest   Fo llow up with PCP as needed  Strict return instructions provided          Follow-up Information    None         Discharge Medication List as of 7/13/2018 10:21 AM      START taking these medications    Details   cyclobenzaprine (FLEXERIL) 10 mg tablet Take 1 tablet (10 mg total) by mouth 3 (three) times a day, Starting Fri 7/13/2018, Normal      traMADol (ULTRAM) 50 mg tablet Take 1 tablet (50 mg total) by mouth every 6 (six) hours as needed for moderate pain for up to 10 days, Starting Fri 7/13/2018, Until Mon 7/23/2018, Print         CONTINUE these medications which have NOT CHANGED    Details   topiramate (TOPAMAX) 25 mg tablet Take 25 mg by mouth, Historical Med      Cholecalciferol ([employer] VITAMIN D3) 1000 units capsule Take by mouth, Historical Med      clobetasol (TEMOVATE) 0 05 % ointment Apply topically, Starting Fri 7/14/2017, Historical Med      gentamicin (GARAMYCIN) 0 3 % ophthalmic solution Administer 1 drop into the left eye every 4 (four) hours, Starting Tue 3/13/2018, Normal      Multiple Vitamin (DAILY VALUE MULTIVITAMIN) TABS Take by mouth, Historical Med      phentermine (ADIPEX-P) 37 5 MG capsule Take by mouth, Historical Med      promethazine-dextromethorphan (PHENERGAN-DM) 6 25-15 mg/5 mL oral syrup Take 5 mL by mouth 4 (four) times a day as needed for cough, Starting Tue 3/13/2018, Normal      valACYclovir (VALTREX) 500 mg tablet Take 500 mg by mouth, Starting Tue 3/13/2018, Until Sun 3/18/2018, Historical Med           No discharge procedures on file      ED Provider  Electronically Signed by           Ronnell Rangel PA-C  07/13/18 0916

## 2018-07-13 NOTE — ED NOTES
Pt denies having allergies to ibuprofen and toradol  Pt states she has never been allergic to them  Allergies removed from system       Yung Edwards RN  07/13/18 1200 [facility] Jo Ann Zavala RN  07/13/18 1412

## 2018-07-16 ENCOUNTER — HOSPITAL ENCOUNTER (EMERGENCY)
Facility: HOSPITAL | Age: 43
Discharge: HOME | End: 2018-07-16
Attending: EMERGENCY MEDICINE
Payer: COMMERCIAL

## 2018-07-16 VITALS
WEIGHT: 180 LBS | HEART RATE: 69 BPM | RESPIRATION RATE: 16 BRPM | SYSTOLIC BLOOD PRESSURE: 125 MMHG | OXYGEN SATURATION: 100 % | TEMPERATURE: 98 F | DIASTOLIC BLOOD PRESSURE: 82 MMHG | HEIGHT: 63 IN | BODY MASS INDEX: 31.89 KG/M2

## 2018-07-16 DIAGNOSIS — T78.40XA ALLERGIC REACTION, INITIAL ENCOUNTER: Primary | ICD-10-CM

## 2018-07-16 PROCEDURE — 99282 EMERGENCY DEPT VISIT SF MDM: CPT

## 2018-07-16 RX ORDER — METHYLPREDNISOLONE 4 MG/1
TABLET ORAL
Qty: 21 TABLET | Refills: 0 | Status: SHIPPED | OUTPATIENT
Start: 2018-07-16 | End: 2018-07-18

## 2018-07-16 RX ORDER — HYDROXYZINE HYDROCHLORIDE 25 MG/1
25 TABLET, FILM COATED ORAL EVERY 6 HOURS
Qty: 12 TABLET | Refills: 0 | Status: SHIPPED | OUTPATIENT
Start: 2018-07-16

## 2018-07-16 RX ORDER — DIPHENHYDRAMINE HCL 25 MG
25 TABLET ORAL ONCE
Status: COMPLETED | OUTPATIENT
Start: 2018-07-16 | End: 2018-07-16

## 2018-07-16 RX ADMIN — PREDNISONE 50 MG: 20 TABLET ORAL at 11:42

## 2018-07-16 RX ADMIN — DIPHENHYDRAMINE HCL 25 MG: 25 TABLET ORAL at 11:42

## 2018-07-16 NOTE — DISCHARGE INSTRUCTIONS
Reacción alérgica general   LO QUE NECESITA SABER:   Rod reacción alérgica es la respuesta de cee cuerpo a un alérgeno  Los alérgenos [employer], alimentos, picaduras de insectos, la caspa de los [place], el moho, el látex, los químicos y los ácaros del polvo  El polen de los [place], [place] césped y las hierbas malas también pueden causar rod reacción alérgica  INSTRUCCIONES SOBRE EL [facility]ITAL[facility]:   Regrese a la kinsey de emergencias si:   · Usted tiene un sarpullido, urticaria, inflamación o comezón en la piel que empeora  · Usted tiene dificultad para respirar, falta de aire, sibilancia o tos  · Cee garganta se siente apretada o migdalia labios o lengua se inflaman  · Usted tiene dificultad para tragar o hablar  · Usted tiene mareos, sensación de aturdimiento, desmayos o confusión  · Usted tiene náusea, vómito, diarrea o calambres abdominales  · Usted tiene dolor o presión en el pecho  Pregúntele a cee [place] vitaminas y minerales son adecuados para usted  · Usted tiene preguntas o inquietudes acerca de cee condición o cuidado  Medicamentos:   · Medicamentos,  para aliviar ciertos síntomas de las [place], wander la comezón, los estornudos y la inflamación  Usted los puede gisselle en forma de píldora o de gotas en migdalia ojos o nariz  Los tratamientos tópicos pueden darse para aplicarse directamente en cee piel para ayudar a disminuir la comezón o la inflamación  · [place] migdalia medicamentos wander se le haya indicado  Consulte con cee médico si usted ivonne que cee medicamento no le está ayudando o si presenta efectos secundarios  Infórmele si es alérgico a algún medicamento  Mantenga rod lista actualizada de los [place], las vitaminas y los productos herbales que yohannes  Incluya los siguientes datos de los medicamentos: cantidad, frecuencia y motivo de administración  Traiga con usted la lista o los envases de la píldoras a migdalia citas de seguimiento   Lleve la lista de los [place] con usted en jaquelin de rod emergencia  Acuda a migdalia consultas de control con recio médico según le indicaron  Anote migdalia preguntas para que se acuerde de hacerlas daniel migdalia visitas  Cuidados personales:   · Evite el alérgeno  que usted ivonne haya causado recio reacción alérgica  · Use compresas frías  en la piel o los ojos si fueron afectados por la reacción alérgica  Es probable que las compresas frías ayuden a aliviar recio piel u ojos  · Enjuague migdalia pasajes nasales  con rod solución salina  El enjuague diario puede llegar a ayudar a mantener limpia recio nariz de alérgenos  · No fume  Los síntomas de migdalia alergias pueden llegar a disminuir si usted no se expone al humo del cigarrillo  La nicotina y otros químicos de los cigarrillos y cigarros también pueden provocar daño en los pulmones  Pida información a recio médico si usted actualmente fuma y necesita ayuda para dejar de fumar  Los cigarrillos electrónicos o tabaco sin humo todavía contienen nicotina  Consulte con recio médico antes de [employer]  © 2017 [address] [employer] Information is for End User's use only and may not be sold, redistributed or otherwise used for commercial purposes  All illustrations and images included in CareNotes® are the copyrighted property of A D A M , Inc  or Jose Maria Toscano  Esta información es sólo para uso en educación  Recio intención no es darle un consejo médico sobre enfermedades o tratamientos  Colsulte con recio Justine Alex farmacéutico antes de seguir cualquier régimen médico para saber si es seguro y efectivo para usted

## 2018-07-18 ENCOUNTER — OFFICE VISIT (OUTPATIENT)
Dept: INTERNAL MEDICINE CLINIC | Facility: CLINIC | Age: 43
End: 2018-07-18
Payer: COMMERCIAL

## 2018-07-18 VITALS
BODY MASS INDEX: 33.28 KG/M2 | HEIGHT: 63 IN | DIASTOLIC BLOOD PRESSURE: 72 MMHG | SYSTOLIC BLOOD PRESSURE: 110 MMHG | HEART RATE: 60 BPM | TEMPERATURE: 97.7 F | WEIGHT: 187.83 LBS

## 2018-07-18 DIAGNOSIS — L50.9 HIVES: Primary | ICD-10-CM

## 2018-07-18 PROCEDURE — 99214 OFFICE O/P EST MOD 30 MIN: CPT | Performed by: NURSE PRACTITIONER

## 2018-07-18 RX ORDER — PREDNISONE 20 MG/1
TABLET ORAL
Qty: 18 TABLET | Refills: 0 | Status: SHIPPED | OUTPATIENT
Start: 2018-07-18

## 2018-07-18 RX ORDER — PREDNISONE 20 MG/1
TABLET ORAL
Qty: 18 TABLET | Refills: 0 | Status: SHIPPED | OUTPATIENT
Start: 2018-07-18 | End: 2018-07-18 | Stop reason: SDUPTHER

## 2018-07-18 NOTE — PROGRESS NOTES
Assessment/Plan:     Diagnoses and all orders for this visit:    Hives  -     predniSONE 20 mg tablet; 1 tab TID x 3 days, BID x3 days, and 1 daily 3 days  -     Stop the medrol dosepak  -     Continue taking the vistaril (this can cause dizziness)  -     Also use benadryl prn (this can cause dizziness)            Subjective: presents to the clinic for rash and itching     Patient ID: Sushant Robertson is a 40 y o  female  HPI  patient was seen at the ED on 07/13/2018 and started on tramadol and flexeril for low back pain  The next day, she started experiencing rash on her R arm, and it has spread to the left arm, knees and back, less so on the chest  She reports it is accompanied by itching, moderate intensity  She was seen at the ED for this rash and treated with one time dose of benadryl and prednisone injection  She was sent home with medrol Dosepak  She says the rash continues to spread  She has been taking hydroxyzine for the itching, which helps a little  Denies CP, SOB, swelling in the throat or on the tongue  No change in vision  She already stopped the tramadol and cyclobenzaprine  Denies any new detergents, no new clothing or bedding, no known exposure to any possible irritants, no recent travel and no person(s) with similar rash known to her  No previous occurrence of this rash  The following portions of the patient's history were reviewed and updated as appropriate: allergies, current medications, past family history, past medical history, past social history, past surgical history and problem list     Review of Systems   Constitutional: Negative for appetite change, chills, diaphoresis, fatigue and fever  Respiratory: Negative for cough, chest tightness, shortness of breath and wheezing  Cardiovascular: Negative for chest pain and palpitations  Endocrine: Negative for heat intolerance, polyphagia and polyuria  Skin: Positive for color change (red) and rash   Negative for pallor and wound          Objective:    /72   Pulse 60   Temp 97 7 °F (36 5 °C) (Oral)   Ht 5' 3" (1 6 m)   Wt 85 2 kg (187 lb 13 3 oz)   BMI 33 27 kg/m²        Physical Exam   Constitutional: She appears well-developed and well-nourished  No distress  Cardiovascular: Normal rate, regular rhythm and normal heart sounds  No murmur heard  Pulmonary/Chest: Effort normal and breath sounds normal  No respiratory distress  She has no wheezes  Skin: Skin is warm and dry  Rash (red raised itchy bumps, consistent with hives  No bleeding or drainage  no open skin  the rash is mostly on the R arm, less so on the L arm and upper back, and even less so the chest  ) noted  She is not diaphoretic  There is erythema (mild  )  No pallor

## 2018-07-18 NOTE — PATIENT INSTRUCTIONS
Urticaria   WHAT YOU NEED TO KNOW:   Urticaria is also called hives  Hives can change size and shape, and appear anywhere on your skin  They can be mild or severe and last from a few minutes to a few days  Hives may be a sign of a severe allergic reaction called anaphylaxis that needs immediate treatment  Urticaria that lasts longer than 6 weeks may be a chronic condition that needs long-term treatment  DISCHARGE INSTRUCTIONS:   Call 911 for signs or symptoms of anaphylaxis,  such as trouble breathing, swelling in your mouth or throat, or wheezing  You may also have itching, a rash, or feel like you are going to faint  Return to the emergency department if:   · Your heart is beating faster than it normally does  · You have cramping or severe pain in your abdomen  Contact your healthcare provider if:   · You have a fever  · Your skin still itches 24 hours after you take your medicine  · You still have hives after 7 days  · Your joints are painful and swollen  · You have questions or concerns about your condition or care  Medicines:   · Epinephrine  is used to treat severe allergic reactions such as anaphylaxis  · Antihistamines  decrease mild symptoms such as itching or a rash  · Steroids  decrease redness, pain, and swelling  · Take your medicine as directed  Contact your healthcare provider if you think your medicine is not helping or if you have side effects  Tell him of her if you are allergic to any medicine  Keep a list of the medicines, vitamins, and herbs you take  Include the amounts, and when and why you take them  Bring the list or the pill bottles to follow-up visits  Carry your medicine list with you in case of an emergency  Steps to take for signs or symptoms of anaphylaxis:   · Immediately  give 1 shot of epinephrine only into the outer thigh muscle  · Leave the shot in place  as directed   Your healthcare provider may recommend you leave it in place for up to 10 seconds before you remove it  This helps make sure all of the epinephrine is delivered  · Call 911 and go to the emergency department,  even if the shot improved symptoms  Do not drive yourself  Bring the used epinephrine shot with you  Safety precautions to take if you are at risk for anaphylaxis:   · Keep 2 shots of epinephrine with you at all times  You may need a second shot, because epinephrine only works for about 20 minutes and symptoms may return  Your healthcare provider can show you and family members how to give the shot  Check the expiration date every month and replace it before it expires  · Create an action plan  Your healthcare provider can help you create a written plan that explains the allergy and an emergency plan to treat a reaction  The plan explains when to give a second epinephrine shot if symptoms return or do not improve after the first  Give copies of the action plan and emergency instructions to family members, work and school staff, and  providers  Show them how to give a shot of epinephrine  · Be careful when you exercise  If you have had exercise-induced anaphylaxis, do not exercise right after you eat  Stop exercising right away if you start to develop any signs or symptoms of anaphylaxis  You may first feel tired, warm, or have itchy skin  Hives, swelling, and severe breathing problems may develop if you continue to exercise  · Carry medical alert identification  Wear medical alert jewelry or carry a card that explains the allergy  Ask your healthcare provider where to get these items  · Keep a record of triggers and symptoms  Record everything you eat, drink, or apply to your skin for 3 weeks  Include stressful events and what you were doing right before your hives started  Bring the record with you to follow-up visits with your healthcare provider  Manage urticaria:   · Cool your skin  This may help decrease itching  Apply a cool pack to your hives  Dip a hand towel in cool water, wring it out, and place it on your hives  You may also soak your skin in a cool oatmeal bath  · Do not rub your hives  This can irritate your skin and cause more hives  · Wear loose clothing  Tight clothes may irritate your skin and cause more hives  · Manage stress  Stress may trigger hives, or make them worse  Learn new ways to relax, such as deep breathing  Follow up with your healthcare provider as directed:  Write down your questions so you remember to ask them during your visits  © 2017 [address] [employer] Information is for End User's use only and may not be sold, redistributed or otherwise used for commercial purposes  All illustrations and images included in CareNotes® are the copyrighted property of A D A M , Inc  or Jose Maria Toscano  The above information is an  only  It is not intended as medical advice for individual conditions or treatments  Talk to your doctor, nurse or pharmacist before following any medical regimen to see if it is safe and effective for you

## 2018-07-23 ENCOUNTER — APPOINTMENT (OUTPATIENT)
Dept: LAB | Facility: CLINIC | Age: 43
End: 2018-07-23
Payer: COMMERCIAL

## 2018-07-23 ENCOUNTER — OFFICE VISIT (OUTPATIENT)
Dept: INTERNAL MEDICINE CLINIC | Facility: CLINIC | Age: 43
End: 2018-07-23
Payer: COMMERCIAL

## 2018-07-23 VITALS
DIASTOLIC BLOOD PRESSURE: 90 MMHG | HEART RATE: 60 BPM | WEIGHT: 189.6 LBS | TEMPERATURE: 99.2 F | HEIGHT: 63 IN | SYSTOLIC BLOOD PRESSURE: 132 MMHG | BODY MASS INDEX: 33.59 KG/M2

## 2018-07-23 DIAGNOSIS — R35.0 URINARY FREQUENCY: ICD-10-CM

## 2018-07-23 DIAGNOSIS — B35.1 TOENAIL FUNGUS: ICD-10-CM

## 2018-07-23 DIAGNOSIS — L50.9 HIVES: ICD-10-CM

## 2018-07-23 DIAGNOSIS — L50.9 HIVES: Primary | ICD-10-CM

## 2018-07-23 LAB
SL AMB  POCT GLUCOSE, UA: NORMAL
SL AMB LEUKOCYTE ESTERASE,UA: NORMAL
SL AMB POCT BILIRUBIN,UA: NORMAL
SL AMB POCT BLOOD,UA: NORMAL
SL AMB POCT CLARITY,UA: NORMAL
SL AMB POCT COLOR,UA: YELLOW
SL AMB POCT KETONES,UA: NORMAL
SL AMB POCT NITRITE,UA: NORMAL
SL AMB POCT PH,UA: 6.5
SL AMB POCT SPECIFIC GRAVITY,UA: 1.01
SL AMB POCT URINE PROTEIN: NORMAL
SL AMB POCT UROBILINOGEN: 0.2

## 2018-07-23 PROCEDURE — 36415 COLL VENOUS BLD VENIPUNCTURE: CPT

## 2018-07-23 PROCEDURE — 81002 URINALYSIS NONAUTO W/O SCOPE: CPT | Performed by: NURSE PRACTITIONER

## 2018-07-23 PROCEDURE — 99214 OFFICE O/P EST MOD 30 MIN: CPT | Performed by: NURSE PRACTITIONER

## 2018-07-23 PROCEDURE — 82785 ASSAY OF IGE: CPT

## 2018-07-23 PROCEDURE — 86003 ALLG SPEC IGE CRUDE XTRC EA: CPT

## 2018-07-23 RX ORDER — FAMOTIDINE 40 MG/1
40 TABLET, FILM COATED ORAL
COMMUNITY
Start: 2018-07-20 | End: 2018-07-25

## 2018-07-23 RX ORDER — DIAPER,BRIEF,INFANT-TODD,DISP
EACH MISCELLANEOUS
COMMUNITY
Start: 2018-07-20 | End: 2018-07-25

## 2018-07-23 NOTE — PROGRESS NOTES
Assessment/Plan:     Diagnoses and all orders for this visit:    [address] [place]; Future  -     [facility] Allergy Panel, Adult; Future  -     Keep apt with allergy specialist  -     Continue taking all meds as directed    Urinary frequency  -     POCT urine dip  -     Urine culture; Future  -     POC urine dip stick positive for Leuks esterace, otherwise negative  -     No antibiotic treatment at this time; will wait for culture report    Toenail fungus  -     Ambulatory referral to Podiatry; Future            Subjective: patient presents to the clinic for urinary frequency     Patient ID: Obie Flower is a 40 y o  female  HPI  She reports she has been using the bathroom several times more than usual  Denies any pain with urination, no blood in urine, and no vag discharge  Denies risk of pregnancy as she had her tubes tied more than 16 years ago  Urine dipstick ordered  She was treated for hives last week  She says they were not going away fast enough so she went the Ed at [facility]  She told to continue using the oral prednisone and to get OTC hydrocortisone 1% and use topically  She has been using it  The old hives have gotten a lot better but a few new ones developed  She was advised at the ED to f/u with allergy specialist to r/o allergy to food or other irritant  She does no seem to have any resp involvement, but since she is ordered to go to the allergist, I will order both food and environmental allergy panels, to help facilitate her visit with the allergy specialist  She already has an apt with allergy specialist in Sept 2018  Her BP is borderline elevated  Will monitor        The following portions of the patient's history were reviewed and updated as appropriate: allergies, current medications, past family history, past medical history, past social history, past surgical history and problem list     Review of Systems   Constitutional: Negative for appetite change, chills, fatigue and fever  HENT: Negative for congestion, rhinorrhea, sore throat and trouble swallowing  Respiratory: Negative for cough, chest tightness, shortness of breath and wheezing  Cardiovascular: Negative for chest pain and palpitations  Gastrointestinal: Negative for abdominal pain, constipation, diarrhea, nausea and vomiting  Genitourinary: Positive for frequency  Negative for difficulty urinating, dysuria, urgency and vaginal discharge  Skin: Positive for rash (hives, a above)  Negative for color change, pallor and wound  Objective:      /90 (BP Location: Right arm, Patient Position: Sitting, Cuff Size: Adult)   Pulse 60   Temp 99 2 °F (37 3 °C) (Oral)   Ht 5' 2 5" (1 588 m)   Wt 86 kg (189 lb 9 5 oz)   BMI 34 12 kg/m²        Physical Exam   Constitutional: She appears well-developed and well-nourished  No distress  Cardiovascular: Normal rate, regular rhythm and normal heart sounds  No murmur heard  Pulmonary/Chest: Effort normal and breath sounds normal  No respiratory distress  She has no wheezes  Abdominal: Soft  Bowel sounds are normal  She exhibits no distension  There is no tenderness  There is no rebound and no guarding  Musculoskeletal:   Toenail fungus on the L great toe  Skin: Skin is warm and dry  Rash (hives on the arms, chest and legs  Generalized  Much improved from last visit  Few new ones  No skin break  or drainage) noted  She is not diaphoretic  No erythema  No pallor

## 2018-07-24 ENCOUNTER — TELEPHONE (OUTPATIENT)
Dept: INTERNAL MEDICINE CLINIC | Facility: CLINIC | Age: 43
End: 2018-07-24

## 2018-07-24 LAB
A ALTERNATA IGE QN: <0.1 KUA/I
A FUMIGATUS IGE QN: <0.1 KUA/I
ALLERGEN COMMENT: ABNORMAL
ALLERGEN COMMENT: NORMAL
ALMOND IGE QN: <0.1 KUA/I
BERMUDA GRASS IGE QN: <0.1 KUA/I
BOXELDER IGE QN: 0.11 KUA/I
C HERBARUM IGE QN: <0.1 KUA/I
CASHEW NUT IGE QN: <0.1 KUA/I
CAT DANDER IGE QN: <0.1 KUA/I
CMN PIGWEED IGE QN: <0.1 KUA/I
CODFISH IGE QN: <0.1 KUA/I
COMMON RAGWEED IGE QN: <0.1 KUA/I
COTTONWOOD IGE QN: <0.1 KUA/I
D FARINAE IGE QN: 7.23 KUA/I
D PTERONYSS IGE QN: 6.06 KUA/I
DOG DANDER IGE QN: <0.1 KUA/I
EGG WHITE IGE QN: <0.1 KUA/I
GLUTEN IGE QN: <0.1 KUA/I
HAZELNUT IGE QN: <0.1 KUA/L
LONDON PLANE IGE QN: <0.1 KUA/I
MILK IGE QN: <0.1 KUA/I
MOUSE URINE PROT IGE QN: <0.1 KUA/I
MT JUNIPER IGE QN: <0.1 KUA/I
MUGWORT IGE QN: <0.1 KUA/I
P NOTATUM IGE QN: <0.1 KUA/I
PEANUT IGE QN: <0.1 KUA/I
ROACH IGE QN: <0.1 KUA/I
SALMON IGE QN: <0.1 KUA/I
SCALLOP IGE QN: <0.1 KUA/L
SESAME SEED IGE QN: <0.1 KUA/I
SHEEP SORREL IGE QN: <0.1 KUA/I
SHRIMP IGE QN: <0.1 KUA/L
SILVER BIRCH IGE QN: <0.1 KUA/I
SOYBEAN IGE QN: <0.1 KUA/I
TIMOTHY IGE QN: <0.1 KUA/I
TOTAL IGE SMQN RAST: 71.5 KU/L (ref 0–113)
TOTAL IGE SMQN RAST: 72.4 KU/L (ref 0–113)
TUNA IGE QN: <0.1 KUA/I
WALNUT IGE QN: <0.1 KUA/I
WALNUT IGE QN: <0.1 KUA/I
WHEAT IGE QN: <0.1 KUA/I
WHITE ASH IGE QN: <0.1 KUA/I
WHITE ELM IGE QN: <0.1 KUA/I
WHITE MULBERRY IGE QN: <0.1 KUA/I
WHITE OAK IGE QN: <0.1 KUA/I

## 2018-07-24 NOTE — TELEPHONE ENCOUNTER
Called patient and made her aware  Patient will be coming in to  results   Results printed and placed in the front drawer under V

## 2018-07-25 ENCOUNTER — TELEPHONE (OUTPATIENT)
Dept: INTERNAL MEDICINE CLINIC | Facility: CLINIC | Age: 43
End: 2018-07-25

## 2018-07-25 NOTE — TELEPHONE ENCOUNTER
Called patient left vm in [place]  in regards to re doing urine test due to urine not being sent to lab

## 2018-08-13 ENCOUNTER — TELEPHONE (OUTPATIENT)
Dept: INTERNAL MEDICINE CLINIC | Facility: CLINIC | Age: 43
End: 2018-08-13

## 2018-08-13 ENCOUNTER — OFFICE VISIT (OUTPATIENT)
Dept: MULTI SPECIALTY CLINIC | Facility: CLINIC | Age: 43
End: 2018-08-13
Payer: COMMERCIAL

## 2018-08-13 VITALS
WEIGHT: 190.92 LBS | BODY MASS INDEX: 33.83 KG/M2 | TEMPERATURE: 97.5 F | SYSTOLIC BLOOD PRESSURE: 110 MMHG | HEIGHT: 63 IN | DIASTOLIC BLOOD PRESSURE: 80 MMHG | HEART RATE: 80 BPM

## 2018-08-13 DIAGNOSIS — B35.1 TOENAIL FUNGUS: ICD-10-CM

## 2018-08-13 DIAGNOSIS — B35.3 TINEA PEDIS OF LEFT FOOT: ICD-10-CM

## 2018-08-13 DIAGNOSIS — B35.1 ONYCHOMYCOSIS: Primary | ICD-10-CM

## 2018-08-13 PROCEDURE — 99203 OFFICE O/P NEW LOW 30 MIN: CPT | Performed by: PODIATRIST

## 2018-08-13 RX ORDER — KETOCONAZOLE 20 MG/G
CREAM TOPICAL 2 TIMES DAILY
Qty: 30 G | Refills: 3 | Status: SHIPPED | OUTPATIENT
Start: 2018-08-13

## 2018-08-13 RX ORDER — CLOBETASOL PROPIONATE 0.5 MG/G
OINTMENT TOPICAL
COMMUNITY
Start: 2018-08-08

## 2018-08-13 NOTE — PROGRESS NOTES
Podiatry Clinic Visit  Bernie Ba 40 y o  female MRN: [id number]  Encounter: [id number]    Assessment/Plan     Assessment:  1  Onychomycosis  2  Tinea pedis    Plan:  - Patient was seen/examined  All questions and concerns addressed  - Rx for ketoconazole 2% cream to be applied BID to plantar aspect of left foot until symptoms resolve and apply BID to left hallux nail for 6 months or until symptoms resolve  - RTC in 6 months if symptoms do not resolve      History of Present Illness     HPI:  Bernie Ba is a 40 y o  female who presents with fungal nail of left great nail  States it has been happening for years  Every summer the nail bed would loosen  States she also has rash on the bottom of her left foot and reports it is itchy  The patient denies any nausea, vomiting, fever, chills, shortness of breath, or chest pains  Review of Systems   Constitutional: Negative  HENT: Negative  Eyes: Negative  Respiratory: Negative  Cardiovascular: Negative  Gastrointestinal: Negative  Musculoskeletal: Negative   Skin: vesicular rash and discolored nail  Neurological: Negative          Historical Information   Past Medical History:   Diagnosis Date    Urinary tract infection     Last Assessed: 2017     Past Surgical History:   Procedure Laterality Date     SECTION      CHOLECYSTECTOMY      KIDNEY STONE SURGERY      TUBAL LIGATION       Social History   History   Alcohol Use No     History   Drug Use No     History   Smoking Status    Never Smoker   Smokeless Tobacco    Never Used     Family History:   Family History   Problem Relation Age of Onset    Depression Mother     Diabetes Father        Meds/Allergies     (Not in a hospital admission)  No Known Allergies    Objective     Current Vitals:   Blood Pressure: 110/80 (18 1548)  Pulse: 80 (18 1548)  Temperature: 97 5 °F (36 4 °C) (18 1548)  Temp Source: Oral (18 154)  Height: 5' 3" (160 cm) (08/13/18 1548)  Weight - Scale: 86 6 kg (190 lb 14 7 oz) (08/13/18 1548)        /80 (BP Location: Right arm, Patient Position: Sitting, Cuff Size: Standard)   Pulse 80   Temp 97 5 °F (36 4 °C) (Oral)   Ht 5' 3" (1 6 m)   Wt 86 6 kg (190 lb 14 7 oz)   BMI 33 82 kg/m²       Lower Extremity Exam:    Musculoskeletal:  MMT is 5/5 to all compartments of the LE, +0/4 edema B/L, Digital ROM is intact,      Pulses:   R DP is +2/4, R PT is +2/4, L DP is +2/4, L PT is +2/4, CFT< 3sec to all digits  Pedal hair is Present     Skin:  Left hallux nail discolored, vesicular lesions noted on plantar aspect of left foot  No open Lesions  Skin of the LE is normal texture, turgor  Neurologic:  Gross sensation is intact   Protective sensation is Intact well developed

## 2018-08-14 DIAGNOSIS — G43.109 MIGRAINE WITH AURA AND WITHOUT STATUS MIGRAINOSUS, NOT INTRACTABLE: Primary | ICD-10-CM

## 2018-08-14 RX ORDER — IBUPROFEN 600 MG/1
600 TABLET ORAL EVERY 8 HOURS PRN
Qty: 60 TABLET | Refills: 1 | Status: SHIPPED | OUTPATIENT
Start: 2018-08-14

## 2018-08-14 NOTE — TELEPHONE ENCOUNTER
I see it was for migraines  Please inform patient that it has been sent to the pharmacy  Thanks   Gonzalez Olivera

## 2019-04-29 NOTE — ED PROVIDER NOTES
History  Chief Complaint   Patient presents with    Rash     Patient reports itchy rash to b/l upper extremities, back and both knees  Rash started yesterday and got worse last night  Not relieved by benadryl or calomine lotion  This is a 60-year-old female patient started with a macular papular erythematous rash that started on her back now is on arm in her legs  Originally she stated she had no new exposures however she did eat shrimp yesterday and then slowly developed a rash  She has eaten strep many times in the past and has never had this reaction  I did explain to her that this may be a new allergy to shellfish  She just describes it being itchy no pain  So her it is no longer expanding  No difficulty breathing no fever no chills no headache no blurred vision or double vision no cough congestion sore throat no nausea vomiting diarrhea abdominal pain  Calamine lotion does make it feel better nothing makes it worse  At this time patient be treated for hives and will be referred to the family doctor to be referred to a allergist and was told not to eat any shellfish  Prior to Admission Medications   Prescriptions Last Dose Informant Patient Reported? Taking? IBUPROFEN PO 7/15/2018 at Unknown time  Yes Yes   Sig: Take by mouth   cyclobenzaprine (FLEXERIL) 10 mg tablet 7/15/2018 at Unknown time  No Yes   Sig: Take 1 tablet (10 mg total) by mouth 3 (three) times a day   topiramate (TOPAMAX) 25 mg tablet 7/15/2018 at Unknown time  Yes Yes   Sig: Take 25 mg by mouth   traMADol (ULTRAM) 50 mg tablet 7/15/2018 at Unknown time  No Yes   Sig: Take 1 tablet (50 mg total) by mouth every 6 (six) hours as needed for moderate pain for up to 10 days      Facility-Administered Medications: None       History reviewed  No pertinent past medical history  Past Surgical History:   Procedure Laterality Date     SECTION      CHOLECYSTECTOMY      KIDNEY STONE SURGERY         History reviewed  No pertinent family history  I have reviewed and agree with the history as documented  Social History   Substance Use Topics    Smoking status: Never Smoker    Smokeless tobacco: Never Used    Alcohol use No        Review of Systems   All other systems reviewed and are negative  Physical Exam  Physical Exam   Constitutional: She appears well-developed and well-nourished  HENT:   Head: Normocephalic and atraumatic  Right Ear: External ear normal    Left Ear: External ear normal    Nose: Nose normal    Mouth/Throat: Oropharynx is clear and moist    Eyes: Conjunctivae are normal  Pupils are equal, round, and reactive to light  Neck: Normal range of motion  Neck supple  Cardiovascular: Normal rate and regular rhythm  Pulmonary/Chest: Effort normal and breath sounds normal    Abdominal: Soft  Bowel sounds are normal  There is no tenderness  Neurological: She is alert  Skin: Skin is warm  Patient has a macular papular erythematous rash that is diffuse but excludes the face it does arin it is very consistent with hives   Psychiatric: She has a normal mood and affect  Her behavior is normal    Nursing note and vitals reviewed        Vital Signs  ED Triage Vitals [07/16/18 1120]   Temperature Pulse Respirations Blood Pressure SpO2   98 °F (36 7 °C) 69 16 125/82 100 %      Temp Source Heart Rate Source Patient Position - Orthostatic VS BP Location FiO2 (%)   Oral Monitor Sitting Left arm --      Pain Score       No Pain           Vitals:    07/16/18 1120   BP: 125/82   Pulse: 69   Patient Position - Orthostatic VS: Sitting       Visual Acuity      ED Medications  Medications   predniSONE tablet 50 mg (not administered)   diphenhydrAMINE (BENADRYL) tablet 25 mg (not administered)       Diagnostic Studies  Results Reviewed     None                 No orders to display              Procedures  Procedures       Phone Contacts  ED Phone Contact    ED Course                               MATTHEW Garcia Time    Disposition  Final diagnoses: Allergic reaction, initial encounter     Time reflects when diagnosis was documented in both MDM as applicable and the Disposition within this note     Time User Action Codes Description Comment    7/16/2018 11:35 AM Arturo Kent Add [[id number]] Allergic reaction, initial encounter       ED Disposition     ED Disposition Condition Comment    Discharge  [id number] [id number] discharge to home/self care  Condition at discharge: Good        Follow-up Information     Follow up With Specialties Details Why [address]  Schedule an appointment as soon as possible for a visit Do not eat shellfish in till cleared by an allergist  [address] [facility]  [phone]          Patient's Medications   Discharge Prescriptions    HYDROXYZINE HCL (ATARAX) 25 MG TABLET    Take 1 tablet (25 mg total) by mouth every 6 (six) hours       Start Date: 7/16/2018 End Date: --       Order Dose: 25 mg       Quantity: 12 tablet    Refills: 0    METHYLPREDNISOLONE 4 MG TBPK    Use as directed on package       Start Date: 7/16/2018 End Date: --       Order Dose: --       Quantity: 21 tablet    Refills: 0     No discharge procedures on file      ED Provider  Electronically Signed by           Robson Choi PA-C  07/16/18 2427 MD Notified